# Patient Record
Sex: FEMALE | Race: OTHER | Employment: UNEMPLOYED | URBAN - METROPOLITAN AREA
[De-identification: names, ages, dates, MRNs, and addresses within clinical notes are randomized per-mention and may not be internally consistent; named-entity substitution may affect disease eponyms.]

---

## 2018-09-27 ENCOUNTER — OFFICE VISIT (OUTPATIENT)
Dept: FAMILY MEDICINE CLINIC | Age: 38
End: 2018-09-27

## 2018-09-27 VITALS
TEMPERATURE: 97.6 F | SYSTOLIC BLOOD PRESSURE: 118 MMHG | HEIGHT: 59 IN | BODY MASS INDEX: 32.34 KG/M2 | DIASTOLIC BLOOD PRESSURE: 84 MMHG | HEART RATE: 76 BPM | WEIGHT: 160.4 LBS

## 2018-09-27 DIAGNOSIS — N75.1 BARTHOLIN'S GLAND ABSCESS: Primary | ICD-10-CM

## 2018-09-27 RX ORDER — CEPHALEXIN 500 MG/1
500 CAPSULE ORAL 3 TIMES DAILY
Qty: 21 CAP | Refills: 0 | Status: SHIPPED | OUTPATIENT
Start: 2018-09-27 | End: 2018-10-04

## 2018-09-27 NOTE — PROGRESS NOTES
Downtime form used, connect care not available  Here for \"bleeding area on my labia\" for 1 month  Physical reveals ruptured abscessed bartholin cyst, rather soft now and only very minimal bloody fluid expressed  Sitz baths, keflex and f/up at gyn clinic for recheck and she is due pap now  She had \"some sort of sterilization surgery 16 years ago\" so pregnancy not a risk   She declines bimanual exam

## 2018-09-27 NOTE — PROGRESS NOTES
Patient seen for discharge with assistance from Margaret Ramirez, . The provider used downtime forms today due to connectivity at the clinic. She entered the patient's Keflex prescription from this computer. Per Good Rx website, no coupon needed for the prescription. It is on the $10 list. We also reviewed the provider's recommendation for using a sitz bath and showed the patient some pictures on Google images so that she would have a better idea of what she was looking for in the store. She also asked for something to help her  the prescription. I printed the prescription order for her to show the pharmacy and she understands that the paper is for her use and does not need to be turned in to the pharmacy. She will go now to registration to schedule a GYN appointment with Devota Clarity per the provider's instruction.  Cathie Johnson RN

## 2018-10-26 ENCOUNTER — OFFICE VISIT (OUTPATIENT)
Dept: OBGYN CLINIC | Age: 38
End: 2018-10-26

## 2018-10-26 VITALS
HEIGHT: 58 IN | BODY MASS INDEX: 34.43 KG/M2 | DIASTOLIC BLOOD PRESSURE: 75 MMHG | WEIGHT: 164 LBS | SYSTOLIC BLOOD PRESSURE: 121 MMHG

## 2018-10-26 DIAGNOSIS — L02.215 ABSCESS OF PERINEUM: Primary | ICD-10-CM

## 2018-10-26 RX ORDER — SULFAMETHOXAZOLE AND TRIMETHOPRIM 800; 160 MG/1; MG/1
1 TABLET ORAL 2 TIMES DAILY
Qty: 20 TAB | Refills: 0 | Status: SHIPPED | OUTPATIENT
Start: 2018-10-26 | End: 2018-11-05

## 2018-10-26 NOTE — PROGRESS NOTES
Chief Complaint Vulvar Abnormaility (Bump on perineum) CYNTHIA Guaman is a 43 y.o. female who presents for the evaluation of bump on perineum Patient's last menstrual period was 10/05/2018 (exact date). The patient complains of bump is painful, drains off/on yellow/blood,. It is located perineum. The symptoms are pain, drainage. They started 2 months ago. Since then they have become slightly more painful. Associated symptoms: nnone. Aggravating symptoms: none. Alleviating factors: draining. The patient denies fever. Was seen through [facility] last must.  Visit reviewed in [facility]. Dx'd with Bartholin's cyst.  Tx'd with Keflex 500mg TOD x7d. Completed abx. Did sitz bath once daily for a week. Not significantly improved. History reviewed. No pertinent past medical history. History reviewed. No pertinent surgical history. Social History Occupational History  Not on file Tobacco Use  Smoking status: Never Smoker  Smokeless tobacco: Never Used Substance and Sexual Activity  Alcohol use: No  
 Drug use: No  
 Sexual activity: Yes  
  Partners: Male Birth control/protection: Condom History reviewed. No pertinent family history. No Known Allergies Prior to Admission medications Not on File Review of Systems: History obtained from the patient Constitutional: negative for weight loss, fever, night sweats HEENT: negative for hearing loss, earache, congestion, snoring, sorethroat CV: negative for chest pain, palpitations, edema Resp: negative for cough, shortness of breath, wheezing Breast: negative for breast lumps, nipple discharge, galactorrhea GI: negative for change in bowel habits, abdominal pain, black or bloody stools : negative for frequency, dysuria, hematuria MSK: negative for back pain, joint pain, muscle pain Skin: negative for itching, rash, hives Neuro: negative for dizziness, headache, confusion, weakness Psych: negative for anxiety, depression, change in mood Heme/lymph: negative for bleeding, bruising, pallor Objective: 
Visit Vitals /75 Ht 4' 10\" (1.473 m) Wt 164 lb (74.4 kg) LMP 10/05/2018 (Exact Date) BMI 34.28 kg/m² Physical Exam: PHYSICAL EXAMINATION Constitutional 
· Appearance: well-nourished, well developed, alert, in no acute distress HENT 
· Head and Face: appears normal 
 
Gastrointestinal 
· Abdominal Examination: abdomen non-tender to palpation, no masses present · Liver and spleen: no hepatomegaly present, spleen not palpable · Hernias: no hernias identified Genitourinary · External Genitalia:  
 
 
 
 
· Vagina: normal vaginal vault without central or paravaginal defects, no discharge or odor present, no inflammatory lesions present, no masses present · Bladder: non-tender to palpation · Urethra: appears normal 
· Cervix: normal  
· Uterus: normal size, shape and consistency · Adnexa: no adnexal tenderness present, no adnexal masses present · Perineum: perineum within normal limits, no evidence of trauma, no rashes or skin lesions present · Anus: anus within normal limits, no hemorrhoids present · Inguinal Lymph Nodes: no lymphadenopathy present Skin · General Inspection: no rash, no lesions identified Neurologic/Psychiatric · Mental Status: · Orientation: grossly oriented to person, place and time · Mood and Affect: mood normal, affect appropriate Assessment:  
Perineal abscess, not Bartholin's 
 
Plan:  
Bactrim DS BID x10d Sitz bath 2-3x/d 
RTO prn

## 2018-11-13 ENCOUNTER — TELEPHONE (OUTPATIENT)
Dept: FAMILY MEDICINE CLINIC | Age: 38
End: 2018-11-13

## 2018-11-13 NOTE — LETTER
11/29/2018 10:21 AM 
 
Ms. Faby Juarez [place] [id number] Por favor llame a la oficina. El agustin es [phone]. Humberto. Sincerely, MARGARITO Yepez/ Agustín Florian RN

## 2018-11-13 NOTE — TELEPHONE ENCOUNTER
Maltese-speaking patient, Obed Sukhdeep, Int. [id number], said her bleeding is worse and wants to make an appointment for a PAP.       Melody Mathis April

## 2018-11-15 NOTE — TELEPHONE ENCOUNTER
Scheduled PAP at 1:10 on 2/4 and tried to call patient, Tamra Gordon, but msg said VM not set up.   I did schedule the PAP for 2/4 at 1:10 pm.      Mita Peterson

## 2018-11-20 NOTE — TELEPHONE ENCOUNTER
I tried to call pt and no answer and voicemail not set up. I tried 3 times. I tried pt's son on PHI and someone answered and then call disconnected.  Bee Perry, RN

## 2018-11-29 NOTE — TELEPHONE ENCOUNTER
Int # [id number]. Tc to the pt's listed home/mobile number. The VM has not been set up. The pt's son was called. No one answered. A VM was left for the pt to contact the [facility] office. A letter will be mailed. Closing encounter.   Brandi Wang RN

## 2019-02-04 ENCOUNTER — OFFICE VISIT (OUTPATIENT)
Dept: FAMILY MEDICINE CLINIC | Age: 39
End: 2019-02-04

## 2019-02-04 ENCOUNTER — HOSPITAL ENCOUNTER (OUTPATIENT)
Dept: LAB | Age: 39
Discharge: HOME | End: 2019-02-04

## 2019-02-04 VITALS
BODY MASS INDEX: 35.11 KG/M2 | DIASTOLIC BLOOD PRESSURE: 84 MMHG | HEART RATE: 93 BPM | SYSTOLIC BLOOD PRESSURE: 116 MMHG | WEIGHT: 168 LBS | TEMPERATURE: 98.7 F

## 2019-02-04 DIAGNOSIS — Z01.419 ENCOUNTER FOR WELL WOMAN EXAM: ICD-10-CM

## 2019-02-04 DIAGNOSIS — L02.215 ABSCESS OF PERINEUM: Primary | ICD-10-CM

## 2019-02-04 PROBLEM — E66.01 SEVERE OBESITY (HCC): Status: ACTIVE | Noted: 2019-02-04

## 2019-02-04 PROCEDURE — 88142 CYTOPATH C/V THIN LAYER: CPT

## 2019-02-04 RX ORDER — CEPHALEXIN 500 MG/1
500 CAPSULE ORAL 3 TIMES DAILY
Qty: 30 CAP | Refills: 0 | Status: SHIPPED | OUTPATIENT
Start: 2019-02-04 | End: 2019-02-14

## 2019-02-04 NOTE — PROGRESS NOTES
Assessment/Plan:    Diagnoses and all orders for this visit:    1. Abscess of perineum  -     cephALEXin (KEFLEX) 500 mg capsule; Take 1 Cap by mouth three (3) times daily for 10 days. Preston 1 pastilla 3 veces al caroline por 10 gonzalez    2. Encounter for well woman exam  -     PAP, LB, RFX HPV [id number]); Future    Refer to [facility] for mammogram in   Sitz baths, abx for small abscess perineum chronic in nature. Recheck in 1 month. Follow-up Disposition:  Return for for recheck of abscess . ALISA Gonsales expressed understanding of this plan. An AVS was printed and given to the patient.      ----------------------------------------------------------------------    Chief Complaint   Patient presents with    Well Woman     pap smear- last done 4 years ago, was normal       History of Present Illness:    , uses condoms for birth control  Pt complains of the same abscess continuing to bother her months after it first started. She was seen by GYN about a month after I saw her in  and given a second abx course- she states that she was doing the sitz baths as well but it never resolved. She doesn't remember how she was able to get the appt with gyn or where she went for the appt. Last pap was \"years ago\". Never has had a mammo  Periods are regular  No DV with current boyfriend  The abscess drains and sometimes is bloody    No past medical history on file. Current Outpatient Medications   Medication Sig Dispense Refill    cephALEXin (KEFLEX) 500 mg capsule Take 1 Cap by mouth three (3) times daily for 10 days. Preston 1 pastilla 3 veces al caroline por 10 gonzalez 30 Cap 0       No Known Allergies    Social History     Tobacco Use    Smoking status: Never Smoker    Smokeless tobacco: Never Used   Substance Use Topics    Alcohol use: No    Drug use: No       No family history on file.     Physical Exam:     Visit Vitals  /84 (BP 1 Location: Left arm, BP Patient Position: Sitting)   Pulse 93   Temp 98.7 °F (37.1 °C) (Oral)   Wt 168 lb (76.2 kg)   LMP 01/05/2019 (Approximate)   BMI 35.11 kg/m²       A&Ox3  WDWN NAD  Respirations normal and non labored  Pelvic exam- ext no discharge. She has a small  abscessed area mid perineum area with out any current pus but with a central open area.  The surrounding area is minimally indurated  Cervix and vagina are w/out lesion or discharge  Uterus and adnexal exam no mass or tenderness

## 2019-02-04 NOTE — PATIENT INSTRUCTIONS
Visita de control para personas de 18 a 50 años: Instrucciones de cuidado - [ Well Visit, Ages 25 to 48: Care Instructions ]  Instrucciones de cuidado    Los exámenes físicos pueden ayudarle a mantenerse saludable. Damon médico lester revisado damon estado general de oziel y podría haberle dado algunos consejos para cuidarse. También podría haberle recomendado otros exámenes. En damon hogar, usted puede ayudar a prevenir enfermedades si come de manera saludable, hace ejercicio con regularidad y sigue otras recomendaciones. La atención de seguimiento es elissa parte clave de damon tratamiento y seguridad. Asegúrese de hacer y acudir a todas las citas, y llame a damon médico si está teniendo problemas. También es elissa buena idea saber los resultados de leonor exámenes y mantener elissa lista de los medicamentos que dia. ¿Cómo puede cuidarse en el hogar? · Alcance un peso saludable y [place]. [place] disminuirá el riesgo de tener [employer], tales tomasa obesidad, diabetes, enfermedad cardíaca y presión arterial ashkan. · Edin actividad física por lo menos 30 minutos la mayoría de los días de la [place]. Caminar es elissa buena opción. Leanne Jeong desee hacer otras actividades, tomasa corretamia, davin, [employer], o jugar al tenis u otros deportes de equipo. Discuta con damon médico cualquier cambio que quiera introducir en damon programa de ejercicios. · No fume ni permita que otros fumen cerca de usted. Si necesita ayuda para dejar de fumar, hable con damon médico sobre programas y medicamentos para dejar de fumar. Pueden aumentar leonor probabilidades de dejar el hábito para siempre. · Consulte con damon médico si presenta factores de riesgo de infecciones de transmisión sexual (STI, por leonor siglas en inglés). Tener elissa sasha niko sexual (que no tenga STI y que no tenga relaciones sexuales con nadie más) es elissa buena manera de evitar estas infecciones. · Utilice métodos anticonceptivos si no desea tener hijos en winter momento.  Consulte con damon médico acerca de las opciones disponibles más adecuadas para usted. · Protéjase la piel del exceso de sol. [address] 10 a.m. y las 4 p.m., permanezca a la julia o Rody Mayra con prendas de vestir y un sombrero de ala ancha. Use gafas de sol que bloqueen los marita ultravioleta. Póngase un protector solar de amplio espectro (SPF 30 o superior) en la piel expuesta, incluso cuando esté nublado. · Acuda al dentista [facility] veces al año para hacerse chequeos y limpiezas dentales. · En el automóvil, use el cinturón de seguridad. · Kimberlyn alcohol en forma moderada, o no kimberlyn nada. [place] significa no más de 2 bebidas al día si es hombre, y no más de 1 al día si es [place]. Siga las recomendaciones de damon médico acerca de cuándo hacerse determinados exámenes. Estas pruebas pueden detectar problemas a tiempo. Para ambos sexos  · Colesterol. Hágase un análisis de la grasa (colesterol) en la sissy después de los 21 años de Fran. Damon médico le indicará con qué frecuencia debe [employer] análisis según damon edad, leonor antecedentes familiares u otros factores que pueden aumentar el riesgo de enfermedad cardíaca. · Presión arterial. Hágase armin la presión arterial randi elissa visita de rutina al médico. Damon médico le dirá con qué frecuencia debe revisarse la presión arterial según damon edad, leonor niveles de presión arterial y otros factores. · Visión. Hable con damon médico acerca de la frecuencia con que debe hacerse elissa prueba de glaucoma. · Diabetes. Pregúntele a damon médico si debería hacerse pruebas para la diabetes. · Cáncer de colon. Hágase elissa prueba para el cáncer de colon a los 48 años. Usted podría hacerse elissa de las [address]. Si tiene menos de [address], podría necesitar hacerse elissa prueba antes si tiene factores de [place]. Los factores de riesgo incluyen si ya le zee extraído un pólipo precanceroso del colon o si alguno de leonor padres, hermanos o hijos lester tenido cáncer de colon.   [address] mujeres  · El examen de senos y la [place]. Pregúntele a koehler médico cuándo debe hacerse un examen clínico de los senos (mamas) y [place]. Los expertos médicos no están de acuerdo en si elissa patrice de menos de 48 años de edad debe o no hacerse estos exámenes o con qué frecuencia. Koelher médico puede ayudarle a decidir qué es lo adecuado para usted. · La prueba de Papanicolaou y el examen Chris Mcgrath a hacerse pruebas de Papanicolaou a los 21 años. El Papanicolaou es la mejor manera de detectar el cáncer de tonya uterino. Esta prueba suele ser parte del examen pélvico. Pregunte con qué frecuencia debe hacerse esta prueba. · Infecciones de transmisión sexual (STI, por leonor siglas en inglés). Consulte si debe hacerse pruebas de detección de STI. Puede correr riesgo si tiene [employer] con más de [place] persona, especialmente si leonor parejas no utilizan condones. Para los hombres  · Infecciones de transmisión sexual (STI). Consulte si debe hacerse pruebas de detección de STI. Puede correr riesgo si tiene [employer] con más de [place] persona, especialmente si usted no utiliza condón. · Examen para el cáncer testicular. Pregúntele a koehler médico si debería hacerse un examen de testículos con regularidad. · Examen de próstata. Hable con koehler médico para saber si debe hacerse un análisis de sissy para el cáncer de próstata (prueba del PSA, por leonor siglas en inglés). Los expertos difieren en cuanto a si los hombres deben hacerse esta prueba y con qué frecuencia. Algunos especialistas lo recomiendan a las personas mayores de 39 años de origen afroamericano o que tienen un padre o un ernst que tuvo cáncer de próstata antes de los 65 Los tato. ¿Cuándo debe pedir ayuda? Preste especial atención a los cambios en koehler oziel y asegúrese de comunicarse con koehler médico si tiene problemas o síntomas que le preocupan. ¿Dónde puede encontrar más información en inglés?   Geovanny Sinks a http://danny-galo.info/. Escriba P072 en la búsqueda para aprender más acerca de \"Visita de control para personas de 18 a 50 años: Instrucciones de cuidado - [ Well Visit, Ages 25 to 48: Care Instructions ]. \"  Revisado: [address], 2018  Versión del contenido: 11.9  © 1604-0267 Healthwise, Incorporated. Las instrucciones de cuidado fueron adaptadas bajo licencia por [employer] (which disclaims liability or warranty for this information). Si usted tiene [place] afección médica o sobre estas instrucciones, siempre pregunte a damon profesional de oziel. Healthwise, Incorporated niega toda garantía o responsabilidad por damon uso de esta información. Absceso cutáneo: Instrucciones de cuidado - [ Skin Abscess: Care Instructions ]  Instrucciones de cuidado    Un absceso de la piel es elissa infección bacteriana que forma elissa bolsa de pus. Un forúnculo es elissa especie de absceso de la piel. Puede que el médico haya hecho elissa incisión en el absceso para que pueda drenar el pus. Vito vez tenga elissa gasa en el valentin para que el absceso se mantenga abierto y siga drenando. Sil Ortiz necesite antibióticos. Deberá hacer un seguimiento con damon médico para asegurarse de que la infección haya desaparecido. El médico lo lester examinado minuciosamente, jackie pueden desarrollarse problemas más tarde. Si nota algún problema o nuevos síntomas, busque tratamiento médico de inmediato. La atención de seguimiento es elissa parte clave de damon tratamiento y seguridad. Asegúrese de hacer y acudir a todas las citas, y llame a damon médico si está teniendo problemas. También es elissa buena idea saber los resultados de leonor exámenes y mantener elissa lista de los medicamentos que dia. ¿Cómo puede cuidarse en el hogar? · Aplíquese compresas calientes y secas, elissa almohadilla térmica ajustada a baja temperatura o elissa bolsa de [place] 3 o 4 veces al día para el dolor.  Mantenga un paño entre la carin de calor y la piel.  · Si damon médico le recetó antibióticos, tómelos según las indicaciones. No deje de tomarlos solo porque se sienta mejor. Debe armin todos los antibióticos hasta terminarlos. · [employer] analgésicos (medicamentos para el dolor) exactamente según las indicaciones. ? Si el médico le recetó analgésicos, tómelos según las indicaciones. ? Si no está tomando un analgésico recetado, pregúntele a damon médico si puede armin un medicamento de [employer]. · Mantenga la venda limpia y seca. Cambie la venda cuando se moje o se ensucie, o por lo menos elissa vez al día. · Si se taponó el absceso con gasa:  ? Yasir Cueto a las citas de seguimiento para que le quiten o le cambien la gasa. Si el médico le indicó que se quitara usted la gasa, siga las instrucciones que le dieron acerca de cómo [place]. ? Después de quitar la gasa, empape la kacie con agua tibia de 15 a 20 minutos 2 veces al día, hasta que la herida se cierre. ¿Cuándo debe pedir ayuda? Llame a damon médico ahora mismo o busque atención médica inmediata si:    · Tiene señales de elissa infección que está empeorando, tales tomasa:  ? Aumento del dolor, la hinchazón, la temperatura o el enrojecimiento. ? Vetas rojizas que emanan de la piel infectada. ? Pus que supura de la herida. ? Romayne Junk de cerca los cambios en damon oziel y asegúrese de comunicarse con damon médico si:    · No mejora tomasa se esperaba. ¿Dónde puede encontrar más información en inglés? Kaushik Smallwood a http://danny-galo.info/. Elfego Snow G323 en la búsqueda para aprender más acerca de \"Absceso cutáneo: Instrucciones de cuidado - [ Skin Abscess: Care Instructions ]. \"  Revisado: 17 kaykay, 2018  Versión del contenido: 11.9  © 0276-0639 [employer]. Las instrucciones de cuidado fueron adaptadas bajo licencia por Good Help Connections (which disclaims liability or warranty for this information).  Si usted tiene [place] afección médica o sobre estas instrucciones, siempre pregunte a damon profesional de oziel. [facility] niega toda garantía o responsabilidad por damon uso de esta información.

## 2019-02-04 NOTE — PROGRESS NOTES
Int # [id number]. Provided pt with information and phone # for [facility]. Explained that they will do a financial screening before scheduling appt. Printed AVS, provided to pt and reviewed. Pt indicated understanding and had no questions. Told pt that rx's have been sent to pharmacy and they should be ready for  in approximately 2 hrs. Reviewed medication ordered today with the pt. Pt instructed to continue sitz bath's. Pt to return next available appt in May for abscess re-check.  Llana Cockayne, RN

## 2019-02-08 ENCOUNTER — TELEPHONE (OUTPATIENT)
Dept: FAMILY MEDICINE CLINIC | Age: 39
End: 2019-02-08

## 2019-02-08 ENCOUNTER — DOCUMENTATION ONLY (OUTPATIENT)
Dept: FAMILY MEDICINE CLINIC | Age: 39
End: 2019-02-08

## 2019-02-08 DIAGNOSIS — R87.611 PAP SMEAR OF CERVIX WITH ASCUS, CANNOT EXCLUDE HGSIL: Primary | ICD-10-CM

## 2019-02-08 NOTE — PROGRESS NOTES
Telephone call to the pt: re to discuss the results of her testing and to indicate that I am referring her to the specialist. I have left a message on her voice mail. I am putting in the referral as I told her on the phone message and I have asked her to answer the phone when you call.  Thank you

## 2019-02-08 NOTE — PROGRESS NOTES
Pap shows ASCUS cannot exclude High grade lesion. I have spoken to the nurse at [facility] and they can take her for this problem.  Please direct the pt to the [facility] program when you call her, or in case she calls

## 2019-02-08 NOTE — TELEPHONE ENCOUNTER
Tc to the pt. Using int # [id number]. Tc to the pt. She was given [facility] phone number and the program was explained to her. The pt was told they would do a financial screening over the phone and then she would be scheduled an appt. The pt was told to let [facility] know she was a pt of the [facility] and she had been referred to [facility] for an abnormal PAP. The pt verbalized understanding.  Jeremy Dumont RN

## 2019-03-19 ENCOUNTER — TELEPHONE (OUTPATIENT)
Dept: FAMILY MEDICINE CLINIC | Age: 39
End: 2019-03-19

## 2019-05-06 ENCOUNTER — OFFICE VISIT (OUTPATIENT)
Dept: FAMILY MEDICINE CLINIC | Age: 39
End: 2019-05-06

## 2019-05-06 DIAGNOSIS — Z00.00 ENCOUNTER FOR WELL WOMAN EXAM WITHOUT GYNECOLOGICAL EXAM: Primary | ICD-10-CM

## 2019-05-06 NOTE — PROGRESS NOTES
Provided pt with information and phone # for [facility]. Explained that they will do a financial screening before scheduling appt. Figueroa Leon was . Pt is on her period today and is now eligible for [facility].  Steven Velasco RN

## 2019-05-06 NOTE — PROGRESS NOTES
Pt presents for pap clinic. Started period yesterday and having heavy bleeding. She is 39 now (as of 2 days ago) so will refer to [facility]. She is due for first mammogram too.

## 2019-05-16 ENCOUNTER — OFFICE VISIT (OUTPATIENT)
Dept: FAMILY PLANNING/WOMEN'S HEALTH CLINIC | Age: 39
End: 2019-05-16

## 2019-05-16 ENCOUNTER — HOSPITAL ENCOUNTER (OUTPATIENT)
Dept: MAMMOGRAPHY | Age: 39
Discharge: HOME | End: 2019-05-16
Attending: NURSE PRACTITIONER

## 2019-05-16 VITALS — SYSTOLIC BLOOD PRESSURE: 117 MMHG | DIASTOLIC BLOOD PRESSURE: 82 MMHG

## 2019-05-16 DIAGNOSIS — Z12.31 VISIT FOR SCREENING MAMMOGRAM: ICD-10-CM

## 2019-05-16 DIAGNOSIS — Z12.31 SCREENING MAMMOGRAM, ENCOUNTER FOR: Primary | ICD-10-CM

## 2019-05-16 PROCEDURE — 77067 SCR MAMMO BI INCL CAD: CPT

## 2019-05-16 NOTE — PROGRESS NOTES
HISTORY OF PRESENT ILLNESS  Karis Sharpe is a 35 y.o. female here for [facility] clinic. HPI Ms. Zeenat Sarkar denies abnormal SBE's performs every now and then. Today is her first mammogram. LMP 05/6/2019 with normal monthly cycles. Denies hormone and BC use. H/O ASCUS on a recent Pap ([facility]) and will be having a colpo done. Non-smoker. Review of Systems   Constitutional: Negative for chills, fever, malaise/fatigue and weight loss. Physical Exam   Constitutional: She is oriented to person, place, and time. She appears well-developed and well-nourished. Pulmonary/Chest: Right breast exhibits no inverted nipple, no mass, no nipple discharge, no skin change and no tenderness. Left breast exhibits no inverted nipple, no mass, no nipple discharge, no skin change and no tenderness. Breasts are symmetrical.   Lymphadenopathy:     She has no cervical adenopathy. She has no axillary adenopathy. Right: No supraclavicular adenopathy present. Left: No supraclavicular adenopathy present. Neurological: She is alert and oriented to person, place, and time. Skin: Skin is warm and dry. Psychiatric: She has a normal mood and affect. Her behavior is normal. Thought content normal.   Nursing note and vitals reviewed. ASSESSMENT and PLAN  1. Komen  2. CBE, benign  3.  Screening mammogram today       -baseline

## 2019-05-16 NOTE — PROGRESS NOTES
EVERY WOMANS LIFE HISTORY QUESTIONNAIRE       No Yes Comments   Has a doctor ever seen or felt anything wrong with your breast? [x]                                  []                                     Have you ever had a breast biopsy? [x]                                  []                                          When and where was last mammogram performed? This is first one    Have you ever been told that there was a problem on your mammogram?   No Yes Comments   []                                  []                                  n/a     Do you have breast implants? No Yes Comments   [x]                                  []                                       When was your last Pap test performed? 2/4/2019 at the [facility]. ASCUS-H    Have you ever had an abnormal Pap test?   No Yes Comments   []                                  [x]                                  ASCUS-H 2/2019. Colpo scheduled for 5/23/19 with [facility]     Have you had a hysterectomy? No Yes Comments (why)   [x]                                  []                                       Have you been through menopause? No Yes Date of LMP   [x]                                  []                                  5/6/19     Did your mother take YOSHI? No Yes Unknown   [x]                                  []                                       Do you have a history of HIV exposure? No Yes    [x]                                  []                                       Have you ever been diagnosed with any type of Cancer   No Yes Comments (type,when,where,type of treatment   [x]                                  []                                          Has a family member been diagnosed with breast or ovarian cancer?    No Yes Comments (which family members, and type   [x]                                  []                                       Are you taking hormone replacement therapy (HRT)     No Yes Comments   [x] []                                       How many times have you been pregnant? 3        Number of live births ? 3    Are you experiencing any of the following? No Yes Comments   Nipple Discharge [x]                                  []                                     Breast Lump/Masses [x]                                  []                                     Breast Skin Changes [x]                                  []                                          No Yes Comments   Vaginal Discharge [x]                                  []                                     Abnormal/unusual vaginal bleeding [x]                                  []                                         Are you experiencing any other health problems? None        Age at first period? 14  Age at first birth?  11

## 2019-05-23 ENCOUNTER — HOSPITAL ENCOUNTER (OUTPATIENT)
Dept: LAB | Age: 39
Discharge: HOME | End: 2019-05-23

## 2019-05-23 ENCOUNTER — OFFICE VISIT (OUTPATIENT)
Dept: OBGYN CLINIC | Age: 39
End: 2019-05-23

## 2019-05-23 VITALS
HEIGHT: 58 IN | HEART RATE: 82 BPM | RESPIRATION RATE: 18 BRPM | DIASTOLIC BLOOD PRESSURE: 78 MMHG | BODY MASS INDEX: 35.89 KG/M2 | WEIGHT: 171 LBS | TEMPERATURE: 98.3 F | OXYGEN SATURATION: 99 % | SYSTOLIC BLOOD PRESSURE: 108 MMHG

## 2019-05-23 DIAGNOSIS — Z13.9 SCREENING PROCEDURE: ICD-10-CM

## 2019-05-23 DIAGNOSIS — R87.611 PAP SMEAR OF CERVIX WITH ASCUS, CANNOT EXCLUDE HGSIL: Primary | ICD-10-CM

## 2019-05-23 LAB
HCG URINE, QL. (POC): NEGATIVE
VALID INTERNAL CONTROL?: YES

## 2019-05-23 NOTE — PROCEDURES
Written and verbal consent obtained. Speculum was inserted and cervix was visualized. Cervix was then coated with acetic acid. Colposcopy was performed with clear and green lenses with the following findings:    Squamocolumnar junction was visualized in its entirety. Area of acetowhite and mosaicism along anterior lip of cervix from 10-1 o'clock  Area of acetowhite from 7 to 9 o'clock    ECC was then performed. Ectocervical biopsies were taken at 12 and 7 o'clock. Hemostasis was achieved using Monsels. Hemostasis was noted to be excellent. Speculum was removed. Patient tolerated the procedure well and there were no complications.

## 2019-05-23 NOTE — PROGRESS NOTES
35 y.o. who presents for follow up of abnormal pap smear. Patient with ASCUS-H pap 2/2019. Has not' hx of abnormal paps. Patient does not smoke. Review of symptoms:  Constitutional: negative  Urinary: negative  CV: negative    Neuro: negative  Resp: negative   Psych: negative  GI: negative    Musculoskeletal: negative  GYN: per HPI    Integumentary: negative    Physical exam:    Gen: AOx3, NAD  Resp: No respiratory distress  GYN: normal external genitalia. Normal vagina. Grossly normal cervix.     A/P  35 y.o. with abnormal pap smear.  - colposcopy  - will call patient with results and plan    Return to clinic PRN or for annual exam     #: [id number]

## 2019-05-23 NOTE — PROGRESS NOTES
Chief Complaint   Patient presents with    Colposcopy     Pt presents in office for colpo. 3 most recent PHQ Screens 5/23/2019   Little interest or pleasure in doing things Not at all   Feeling down, depressed, irritable, or hopeless Not at all   Total Score PHQ 2 0     1. Have you been to the ER, urgent care clinic since your last visit? Hospitalized since your last visit? No    2. Have you seen or consulted any other health care providers outside of the [address] [place] [address] since your last visit? Include any pap smears or colon screening.  No    JORGE CABALLERO AT [facility]  OFFICE PROCEDURE PROGRESS NOTE        Chart reviewed for the following:   Alfredo WHELAN LPN, have reviewed the History, Physical and updated the Allergic reactions for Javon 71 performed immediately prior to start of procedure:   Alfredo WHELAN LPN, have performed the following reviews on Syed Horn prior to the start of the procedure:            * Patient was identified by name and date of birth   * Agreement on procedure being performed was verified  * Risks and Benefits explained to the patient  * Procedure site verified and marked as necessary  * Patient was positioned for comfort  * Consent was signed and verified     Time: 1:26 pm      Date of procedure: 5/23/2019    Procedure performed by:  Eliana Boucher MD    Provider assisted by:  LPN    Patient assisted by: self    How tolerated by patient: tolerated the procedure well with no complications    Post Procedural Pain Scale: 0 - No Hurt    Comments: none        Results for orders placed or performed in visit on 05/23/19   AMB POC URINE PREGNANCY TEST, VISUAL COLOR COMPARISON   Result Value Ref Range    VALID INTERNAL CONTROL POC Yes     HCG urine, Ql. (POC) Negative Negative

## 2019-05-23 NOTE — PATIENT INSTRUCTIONS
Prueba de Papanicolaou anormal: Instrucciones de cuidado - [ Abnormal Pap Test: Care Instructions ] Instrucciones de cuidado La prueba de Papanicolaou (también llamada citología vaginal) se hace para detectar cambios tempranos que podrían convertirse en cáncer de tonya uterino. Damon prueba de Papanicolaou resultó anormal. Eso podría significar que algunas células del tonya uterino [place]. Los cambios celulares en damon mayoría son causados por infección del virus del papiloma humano (VPH). Tener un resultado anormal de la prueba de Papanicolaou no significa que las células anómalas le causarán cáncer. Los [employer] células del tonya uterino podrían desaparecer por sí solos o progresar lentamente. Es posible que damon médico [place] repetir la prueba de Papanicolaou o que usted se armando otras pruebas para luis manuel si hay cambios celulares. Es muy importante que se armando pruebas de Papanicolaou con regularidad después de annalee tenido elissa prueba de Papanicolaou anormal. 
La atención de seguimiento es elissa parte clave de damon tratamiento y seguridad. Asegúrese de hacer y acudir a todas las citas, y llame a damon médico si está teniendo problemas. También es elissa buena idea saber los resultados de leonor exámenes y mantener elissa lista de los medicamentos que dia. Cómo puede cuidarse en el hogar? · No fume. Fumar podría aumentar el riesgo de cambios en las células del tonya uterino. Si necesita ayuda para dejar de fumar, hable con damon médico sobre programas y medicamentos para dejar de fumar. Estos pueden aumentar leonor probabilidades de dejar el hábito para siempre. · Asegúrese de Mitali Oh pruebas de Papanicolaou u otras pruebas de seguimiento que damon médico le haya indicado. Cuándo debe pedir ayuda? Vigile muy de cerca los cambios en damon oziel, y asegúrese de comunicarse con damon médico si tiene algún problema. Dónde puede encontrar más información en inglés? Khai Thakkar a http://danny-galo.info/. Escriba P925 en la búsqueda para aprender más acerca de \"Prueba de Papanicolaou anormal: Instrucciones de cuidado - [ Abnormal Pap Test: Care Instructions ]. \" 
Revisado: 27 marzo, 2018 Versión del contenido: 11.9 © 1288-1473 Healthwise, Incorporated. Las instrucciones de cuidado fueron adaptadas bajo licencia por Good Help Connections (which disclaims liability or warranty for this information). Si usted tiene [place] afección médica o sobre estas instrucciones, siempre pregunte a damon profesional de oziel. Healthwise, Incorporated niega toda garantía o responsabilidad por damon uso de esta información. Colposcopia: Antes del procedimiento - [ Colposcopy: Before Your Procedure ] Radha Boris es elissa colposcopia? La colposcopia le permite al médico examinarle la vulva, la vagina y el tonya uterino. Si el médico detecta un posible problema, puede armin elissa pequeña muestra de tejido. Luego, otro médico observa el tejido bajo un microscopio. [place] se llama biopsia. A la mayoría de las mujeres se les hace winter procedimiento cuando obtienen resultados anormales de elissa prueba de Papanicolaou. Yadira la prueba, el médico le insertará un instrumento lubricado en la vagina. Se llama espéculo y separa suavemente los costados de la vagina. [place] le permite al [employer] luis manuel dentro de damon vagina y el tonya uterino. El médico además utilizará un dispositivo de aumento para poder luis manuel mejor. Winter dispositivo no se introduce en la vagina. Es posible que el médico le coloque vinagre o yodo en el tonya uterino. [place] puede ayudarle al médico a observar cualquier kacie que no sea normal. A veces, el médico también dia fotos o video. El espéculo puede ser algo molesto cuando se introduce. Si el médico hace elissa biopsia, es posible que usted sienta un pellizco y tenga algunos cólicos. La atención de seguimiento es elissa parte clave de damon tratamiento y seguridad.  Asegúrese de hacer y acudir a todas las citas, y llame a damon médico si está teniendo problemas. También es elissa buena idea saber los resultados de los exámenes y mantener elissa lista de los medicamentos que dia. Zachary Copas antes del procedimiento? 
 Cómo prepararse para el procedimiento 
  · Dígale a damon médico si: 
? Está teniendo damon período menstrual. Esta prueba no suele hacerse randi el período. [place] se debe a que la sissy hace que sea más difícil luis manuel el tonya uterino. ? Está o pudiera estar embarazada. Se puede hacer un análisis de [place] o de [place] para luis manuel si está usted [place]. La colposcopia es garcia randi el [facility]. La probabilidad de aborto espontáneo es muy pequeña. [place] podría tener algo de sangrado si le hacen elissa biopsia. ? Dia anticoagulantes, tomasa warfarina (Coumadin), clopidogrel (Plavix) o aspirina.  
  · No se armando lavados vaginales, no use tampones, no tenga relaciones sexuales ni use medicamentos vaginales randi 24 horas antes de la prueba.  
  · Entienda exactamente qué procedimiento está planificado, junto con los [place], los beneficios y las otras alternativas. · Infórmeles a leonor médicos sobre [employer], las vitaminas, los suplementos y los raegan herbarios que dia. Algunos de [employer] aumentar el riesgo de sangrado.  
  · Damon médico le dirá qué medicamentos armin o dejar de armin antes del procedimiento. Es posible que deba dejar de armin ciertos medicamentos elissa semana o más antes del procedimiento, así que hable con damon médico tan pronto tomasa pueda.  
  · Informe a damon médico si tiene instrucciones médicas por anticipado. Estas pueden incluir un testamento vital y un poder legal permanente para la atención médica. Lleve consigo elissa copia cuando vaya al hospital. Si no las tiene, sería conveniente prepararlas. [employer] [facility] [place] médico y seres queridos sabrán leonor deseos sobre la [address].  Los médicos recomiendan que todas las personas preparen estos documentos antes de cualquier tipo de [place] o procedimiento. Los procedimientos pueden ser estresantes. Esta información le ayudará a entender qué puede esperar. Y le ayudará a prepararse de manera garcia para el procedimiento. [place] el día del procedimiento? 
  · Vito vez desee armin un analgésico (medicamento para el dolor) entre 30 y 60 minutos antes de la prueba. [place] puede ayudar a reducir los cólicos debidos a la biopsia. El ibuprofeno (Advil o Motrin) es elissa buena opción.  
  · Báñese o dúchese antes de acudir al consultorio médico para el procedimiento. No use lociones, perfumes, desodorantes ni esmalte de uñas.  
 En el consultorio médico 
 · Lleve un documento de identidad con foto.  
  · El procedimiento durará entre 15 y 30 minutos. El regreso a damon hogar · Recibirá instrucciones más específicas acerca de la recuperación del procedimiento. Cuándo debe llamar a damon médico? 
 · Tiene preguntas o inquietudes.  
  · No entiende cómo debe prepararse para el procedimiento.  
  · Se enferma antes del procedimiento (por ejemplo, tiene fiebre, un resfriado o gripe).  
  · Necesita reprogramar el procedimiento o cambió de opinión acerca de hacerse el procedimiento. Dónde puede encontrar más información en inglés? Vicente Sandeep a http://danny-galo.info/. Nicole HOFF en la búsqueda para aprender más acerca de \"Colposcopia: Antes del procedimiento - [ Colposcopy: Before Your Procedure ]. \" 
Revisado: 27 marzo, 2018 Versión del contenido: 11.9 © 2493-7499 Shopo, Incorporated. Las instrucciones de cuidado fueron adaptadas bajo licencia por Good Help Connections (which disclaims liability or warranty for this information). Si usted tiene [place] afección médica o sobre estas instrucciones, siempre pregunte a damon profesional de oziel. [facility], Incorporated niega toda garantía o responsabilidad por damon uso de esta información. Colposcopia: [place] en el [facility] - [ Colposcopy: What to Expect at [facility] ] Damon recuperación Si le hicieron elissa biopsia, es posible que sienta algo de dolor en la vagina randi un [address] Algo de sangrado o flujo vaginal es normal hasta elissa semana después de elissa biopsia. El flujo puede ser de color oscuro si le pusieron elissa solución en el tonya uterino. Puede usar elissa toalla sanitaria para el sangrado. Podría tardar [facility] a [place] para que tenga los [place] de la prueba. Esta hoja de [employer] idea general del tiempo que le llevará recuperarse. Sin embargo, cada persona se recupera a un ritmo diferente. Siga los pasos a continuación para mejorar con la mayor rapidez posible. Cómo puede cuidarse en el [facility]? Actividad 
  · Puede volver al [place] y a la mayoría de leonor actividades diarias inmediatamente después de la prueba. Ejercicio 
  · No armando ejercicio randi 1 día después de la prueba. Medicamentos 
  · Damon médico le dirá si puede volver a armin leonor medicamentos y cuándo puede volver a hacerlo. También le dará indicaciones sobre cualquier medicamento nuevo que deba armin usted.  
  · Si dia medicamentos que previenen la formación de coágulos de [place], tomasa warfarina (Coumadin), clopidogrel (Plavix) o aspirina, asegúrese de hablar con damon médico. Él o ladarius le dirá si debe volver a armin estos medicamentos y en qué momento. Asegúrese de que entiende exactamente lo que el médico quiere que armando.  
  · Superior un analgésico (medicamento para el dolor) de venta luz maria, tomasa acetaminofén (Tylenol), ibuprofeno (Advil, Motrin) o naproxeno (Aleve). Sea kristin con los medicamentos. [place] y siga todas las instrucciones de la [place]. No tome dos o más analgésicos al mismo tiempo a menos que el médico se lo haya indicado. Muchos analgésicos contienen acetaminofén, es decir, Tylenol. El exceso de acetaminofén (Tylenol) puede ser dañino. Otras instrucciones   · Use elissa toalla sanitaria si tiene algo de sangrado.  
  · No se armando lavados vaginales, no tenga relaciones sexuales ni use tampones randi 1 semana si le hicieron elissa biopsia. [place] dará tiempo para que sane el tonya uterino.  
  · Puede bañarse o ducharse en cualquier momento después de la prueba. Esta hoja de [employer] idea general de cuánto tiempo tardará en recuperarse. Sin embargo, cada persona se recupera a un ritmo diferente. Siga los pasos siguientes para sentirse mejor iglesias pronto tomasa sea posible. Cuándo debe pedir ayuda? Llame a damon médico ahora mismo o busque atención médica inmediata si: 
  · Tiene sangrado vaginal intenso. [place] significa que empapa las toallas sanitarias o los tampones que suele usar cada hora, randi 2 o más horas.  
  · El dolor no mejora después de armin analgésicos.  
  · Tiene señales de infección, tales tomasa: ? [place] Mitali Oh. ? Flujo vaginal con un olor desagradable. ? [place] especial atención a cualquier cambio en damon oziel y asegúrese de ponerse en contacto con damon médico si: 
  · Tiene preguntas o inquietudes. Dónde puede encontrar más información en inglés? [place] a http://danny-galo.info/. Glorya Sacks M523 en la búsqueda para aprender más acerca de \"Colposcopia: Say [place] en el [facility] - [ Colposcopy: What to Expect at [facility] ]. \" 
Revisado: 27 marzo, 2018 Versión del contenido: 11.9 © 0589-4368 Revaluate, Incorporated. Las instrucciones de cuidado fueron adaptadas bajo licencia por Good Help Connections (which disclaims liability or warranty for this information). Si usted tiene [place] afección médica o sobre estas instrucciones, siempre pregunte a damon profesional de oziel. [facility], Incorporated niega toda garantía o responsabilidad por damon uso de esta información.

## 2019-05-29 NOTE — PROGRESS NOTES
Called pt with  employee number [id number] pt adv of all results and pt was scheduled for colpo at 3:00 PM ON 6-12-19 at [facility] pt verbalized understanding to all results and location time and date of procedure.

## 2019-06-03 ENCOUNTER — TELEPHONE (OUTPATIENT)
Dept: FAMILY MEDICINE CLINIC | Age: 39
End: 2019-06-03

## 2019-06-03 NOTE — TELEPHONE ENCOUNTER
Patient called main office wanted to talk to a nurse about some paper that they need to be sent to the specialist and a appointment that she don't know when exactly is it. Patient sound very confused and would like someone to call her back in Slovenian please .     Thank you

## 2019-06-05 ENCOUNTER — TELEPHONE (OUTPATIENT)
Dept: FAMILY PLANNING/WOMEN'S HEALTH CLINIC | Age: 39
End: 2019-06-05

## 2019-06-05 NOTE — TELEPHONE ENCOUNTER
Talked to patient about what  a LEEP. Asked patient if she had any questions but she said not that she understood. Also sent her a financial aid application and explained the process within [facility] [employer]. Told her that [facility] is working to decide if [facility] is better or BS financial aid so I told her someone would be in touch from [facility] as well.

## 2019-06-06 ENCOUNTER — DOCUMENTATION ONLY (OUTPATIENT)
Dept: FAMILY MEDICINE CLINIC | Age: 39
End: 2019-06-06

## 2019-06-06 NOTE — TELEPHONE ENCOUNTER
I have spoken with the patient. Please seen documentation only note. Patient has applied for the Care Card and has established care with [address] [place] [address] OB/GYN so she does not need a referral through [facility] for GYN at this time.  Modesta Holliday RN

## 2019-06-06 NOTE — PROGRESS NOTES
From: MARGARITO Benoit   Sent: 6/4/2019  10:43 AM   To: Alveta Blizzard, GERMAIN, Shruthi Amos, GERMAIN, *   Subject: FW: LEEP                                         Please help me with this pt. She had abnormal pap and was [facility]. [facility] paid for colpo but this too was abnormal and she needs to have a LEEP, which [facility] does not pay for. She has an appt scheduled for 6/12/19. Can someone call her and explain to her about the care card application? That would obviously be the easiest solution but if Mari and Fox Sommers think that this should be changed to access now then I will be happy to start the process. Per chart review, the patient has recently seen Dr. Jena Sue and Dr. Samira Del Real, [facility]. Telephone call made to the patient with assistance from [employer]  #[id number]. The patient stated that she has not received any bills for her recent OG/GYN visits and has applied for the Care Card. She also stated that she spoke with the Care Card people yesterday and was told that she should be receiving her card soon.      Since the patient has established care with [facility] and has applied for the Care Card, she does not need a referral for GYN to [facility] at this time.     -Mari

## 2019-06-11 ENCOUNTER — DOCUMENTATION ONLY (OUTPATIENT)
Dept: FAMILY PLANNING/WOMEN'S HEALTH CLINIC | Age: 39
End: 2019-06-11

## 2019-06-12 ENCOUNTER — HOSPITAL ENCOUNTER (OUTPATIENT)
Dept: LAB | Age: 39
Discharge: HOME | End: 2019-06-12

## 2019-06-12 ENCOUNTER — OFFICE VISIT (OUTPATIENT)
Dept: OBGYN CLINIC | Age: 39
End: 2019-06-12

## 2019-06-12 DIAGNOSIS — D06.9 CIN III (CERVICAL INTRAEPITHELIAL NEOPLASIA GRADE III) WITH SEVERE DYSPLASIA: Primary | ICD-10-CM

## 2019-06-12 LAB
HCG URINE, QL. (POC): NEGATIVE
VALID INTERNAL CONTROL?: YES

## 2019-06-12 RX ORDER — HYDROCORTISONE 25 MG/G
CREAM TOPICAL 3 TIMES DAILY
Qty: 30 G | Refills: 2 | Status: SHIPPED | OUTPATIENT
Start: 2019-06-12 | End: 2020-06-11

## 2019-06-12 NOTE — PROGRESS NOTES
Chief Complaint   Patient presents with   Laci Morales presents in office for LEEP procedure. 10 ml of Lidocaine was used Lot # [id number] Exp 12/2019 AMF:[id number]    3 most recent PHQ Screens 6/12/2019   Little interest or pleasure in doing things Not at all   Feeling down, depressed, irritable, or hopeless Not at all   Total Score PHQ 2 0     1. Have you been to the ER, urgent care clinic since your last visit? Hospitalized since your last visit? No    2. Have you seen or consulted any other health care providers outside of the [address] [place] [address] since your last visit? Include any pap smears or colon screening.  No        JORGE CABALLERO AT [facility]  OFFICE PROCEDURE PROGRESS NOTE        Chart reviewed for the following:   Enrique WHELAN LPN, have reviewed the History, Physical and updated the Allergic reactions for Javon 71 performed immediately prior to start of procedure:   Enrique WHELAN LPN, have performed the following reviews on Ramona Certain prior to the start of the procedure:            * Patient was identified by name and date of birth   * Agreement on procedure being performed was verified  * Risks and Benefits explained to the patient  * Procedure site verified and marked as necessary  * Patient was positioned for comfort  * Consent was signed and verified     Time: 2:45 pm      Date of procedure: 6/12/2019    Procedure performed by:  Lane Adam MD    Provider assisted by:  LPN    Patient assisted by: self    How tolerated by patient: tolerated the procedure well with no complications    Post Procedural Pain Scale: 0 - No Hurt    Comments: none    ID [id number]

## 2019-06-12 NOTE — PROGRESS NOTES
35 y.o. who presents for LEEP    Patient with colposcopy for ASCUS -H pap    Pathology showed:     1. Cervix, endocervical curettage:   Free-floating fragments of squamous epithelium demonstrating high-grade squamous intraepithelial lesion (LUIS 2-3). Fragments of benign endocervical epithelium. 2. Cervix, 7:00, biopsy:   Scant fragments of squamous epithelium demonstrating high-grade squamous intraepithelial lesion (LUIS 2-3). 3. Cervix, 12:00, biopsy:   Predominantly benign squamous epithelium with few clusters of atypical cells suspicious for high-grade squamous intraepithelial lesion.      Has no hx of abnormal paps.     Patient does not smoke.     Review of symptoms:  Constitutional: negative                      Urinary: negative  CV: negative                                       Neuro: negative  Resp: negative                                    Psych: negative  GI: negative                                         Musculoskeletal: negative  GYN: per HPI                                      Integumentary: negative     Physical exam:     Gen: AOx3, NAD  Resp: No respiratory distress  GYN: normal external genitalia. Normal vagina. Grossly normal cervix.   Anal:  Posterior hemorrhoid present     A/P  35 y.o. with LUIS III  - LEEP  - anusol for hemorrhoids (TID for 1 week then PRN)    Return to clinic in 2 weeks     #: [id number]

## 2019-06-12 NOTE — PATIENT INSTRUCTIONS
Procedimiento de escisión electroquirúrgica con asa: Antes del procedimiento - [ Loop Electrosurgical Excision Procedure (LEEP): Before Your Procedure ]  ¿Qué es el procedimiento de escisión electroquirúrgica con asa? El procedimiento de escisión electroquirúrgica con asa (LEEP, por leonor siglas en inglés) extirpa tejido del tonya uterino. Es posible que le ismael winter procedimiento si le hicieron elissa prueba de Papanicolaou y [place] tejido que no es normal.  Randi el LEEP, damon médico le insertará en la vagina un instrumento llamado espéculo. Winter separa suavemente los lados de la vagina. [employer] le permite al médico luis manuel el tonya uterino y el interior de la vagina. A veces se pone un líquido especial en el tonya uterino para que ciertas zonas hiren más fáciles de luis manuel. Es posible que le den elissa inyección de medicamento para adormecer el tonya uterino. Vito vez sienta un cólico cuando le den la inyección. También es posible que le administren analgésicos (medicamentos para el dolor). Damon médico le insertará un dispositivo con un asa de alambre tomas en la vagina. El médico utilizará el alambre caliente para extirpar el tejido. Podría tener cólicos leves randi varias horas después del LEEP. Es normal tener un flujo de color amarronado oscuro randi la primera semana. Es posible que tenga un poco de manchado randi aproximadamente 3 semanas. Usted debería poder retomar damon rutina habitual entre 1 y 3 camilla [facility]. El tiempo que le lleve recuperarse dependerá de lo que le hicieron randi el procedimiento. La atención de seguimiento es elissa parte clave de damon tratamiento y seguridad. Asegúrese de hacer y acudir a todas las citas, y llame a damon médico si está teniendo problemas. También es elissa buena idea saber los resultados de los exámenes y mantener elissa lista de los medicamentos que dia.   ¿Qué ocurre antes del procedimiento?   Cómo prepararse para el procedimiento    · Dígale a damon médico si:  ? Está teniendo damon período menstrual.  ? Está o pudiera estar embarazada. Podrían hacerle un análisis de [place] o de [facility] para luis manuel si está usted embarazada.     · No se armando lavados vaginales ni utilice tampones y no tenga relaciones sexuales ni use medicamentos vaginales randi 24 horas antes de la prueba.     · Entienda exactamente qué procedimiento está planificado, junto con los [place], los beneficios y las otras alternativas. · Infórmeles a leonor médicos sobre [employer], las vitaminas, los suplementos y los raegan herbarios que dia. Algunos de [employer] aumentar el riesgo de sangrado o interactuar con la anestesia.     · Si dia anticoagulantes, tomasa warfarina (Coumadin), clopidogrel (Plavix) o aspirina, asegúrese de hablar con damon médico. Le dirá si debe dejar de armin estos medicamentos antes del procedimiento. Asegúrese de entender exactamente lo que damon médico quiere que armando.     · Damon médico le dirá qué medicamentos armin o dejar de armin antes del procedimiento. Es posible que deba dejar de armin ciertos medicamentos elissa semana o más antes del procedimiento, así que hable con damon médico tan pronto tomasa pueda. Los procedimientos pueden ser estresantes. Esta información le ayudará a entender qué puede esperar. Y le ayudará a prepararse de manera garcia para el procedimiento. ¿Qué ocurre el día del procedimiento?    · [de-identified] comer y beber tomasa lo hace normalmente.     · Báñese o dúchese antes de acudir al hospital para el procedimiento. No use lociones, perfumes, desodorantes ni esmalte de uñas.   · Vito vez desee armin un analgésico (medicamento para el dolor), tomasa ibuprofeno (Advil o Motrin), entre 30 y 60 minutos antes del procedimiento. [place] puede ayudar a reducir el dolor de los cólicos.     · Quítese todas las joyas y los \"piercings\".  Si lleva lentes de contacto, quíteselos también.    En el consultorio médico o el hospital   · [employer] un documento de identidad con foto.     · El anestesista la hará sentir cómoda y garcia. Es posible que se le dé un medicamento que la relajará o adormecerá ligeramente. La kacie que se va a tratar estará adormecida.     · El procedimiento durará entre 15 y 30 minutos, aproximadamente. El regreso a damon hogar  · Recibirá instrucciones más específicas acerca de la recuperación del procedimiento. Acesso F 935 alimentación, el cuidado de las heridas, la atención de seguimiento, el manejo de vehículos y la vuelta a damon rutina habitual.  ¿Cuándo debe llamar a damon médico?   · Tiene preguntas o inquietudes.     · No entiende cómo debe prepararse para el procedimiento.     · Se enferma antes del procedimiento (por ejemplo, tiene fiebre, un resfriado o gripe).     · Necesita reprogramar el procedimiento o cambió de opinión acerca de hacerse el procedimiento. ¿Dónde puede encontrar más información en inglés? Chelo Payton a http://danny-galo.info/. Luly Joseph U860 en la búsqueda para aprender más acerca de \"Procedimiento de escisión electroquirúrgica con asa: Antes del procedimiento - [ Loop Electrosurgical Excision Procedure (LEEP): Before Your Procedure ]. \"  Revisado: Andrew 66, 2018  Versión del contenido: 11.9  © 6888-7921 Healthwise, Incorporated. Las instrucciones de cuidado fueron adaptadas bajo licencia por [employer] (which disclaims liability or warranty for this information). Si usted tiene [place] afección médica o sobre estas instrucciones, siempre pregunte a damon profesional de oziel. Healthwise, Incorporated niega toda garantía o responsabilidad por damon uso de esta información.

## 2019-06-12 NOTE — PROCEDURES
LEEP procedure    Ramona Monroe is a 35 y.o. female who presents to the office today for a cervical LEEP procedure. LEEP is indicated because of LUIS III findings at colposcopy. The risks, benefits, and alternatives of the procedure were discussed, the patient's questions were answered and informed consent was obtained. A urine pregnancy test is negative. PROCEDURE:  The patient was placed in the dorsal lithotomy position and a grounding pad was placed on the patient's left thigh and connected to the Bovie device. The speculum exam revealed a multiparous cervix. There was no bleeding or discharge present. A LEEP coated speculum was placed into the vagina and the cervix was again visualized colposcopically. Colposcopy was repeated and there was a lesion visualized at 12 and 7. The cervix was injected with 10 cc's of 1% Lidocaine w/epinephrine. A satisfactory amount of time was given for the anesthetic to take effect. The patient tolerated the injections well with no untoward effects. Following this,  The LEEP generator was set at 50 lea cut and 40 lea coag. A medium loop was then used to take an ectocervical specimen in 2 sections (anterior and posterior lip). A separate endocervical specimen was also taken. An ECC was not performed. The ball electrode was used to obtain hemostasis. Monsels solution was not applied. The speculum was removed. The specimen was labeled, placed in formalin, and sent to the pathologist. The patient tolerated the LEEP procedure well. She was observed for 15 after the procedure. She was discharged from the office in stable condition.

## 2019-06-20 ENCOUNTER — DOCUMENTATION ONLY (OUTPATIENT)
Dept: FAMILY PLANNING/WOMEN'S HEALTH CLINIC | Age: 39
End: 2019-06-20

## 2019-06-20 NOTE — PROGRESS NOTES
Note from Dr. Sonia Truong asking about the plan of care after the result of her LEEP:    So, we will need to repeat pap smear with cotesting and ECC in 6 months. I am not as concerned with the endocervical margin being unclear (one side is clear and that is most likely the deep side as squamous lesions do not usually skip areas).  The CIS just means full thickness abnormal cells without any basement membrane penetration.  If this was noted during colposcopy biopsies, we would have done a CKC rather than LEEP.  The LEEP should be curative however. I talked to Dr. Velvet Serna of [facility] to go over the plan to make sure that I was not missing anything, and he was in total agreement. Please let me know if you have any other questions regarding this.

## 2019-06-26 ENCOUNTER — OFFICE VISIT (OUTPATIENT)
Dept: OBGYN CLINIC | Age: 39
End: 2019-06-26

## 2019-06-26 VITALS
RESPIRATION RATE: 18 BRPM | WEIGHT: 165.4 LBS | SYSTOLIC BLOOD PRESSURE: 104 MMHG | HEIGHT: 58 IN | BODY MASS INDEX: 34.72 KG/M2 | DIASTOLIC BLOOD PRESSURE: 70 MMHG | HEART RATE: 79 BPM

## 2019-06-26 DIAGNOSIS — Z09 POSTOPERATIVE EXAMINATION: Primary | ICD-10-CM

## 2019-06-26 NOTE — PROGRESS NOTES
35 y.o. y/o who presents for postoperative exam.  Pt is s/p LEEP on 6/12/19. Pt is doing well without complaints. Surgical pathology:      1. 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 Normal vagina.   Cervix healing well    Plan:  - Cleared of all restrictions  - cotesting in 6 month    Return to clinic in 6 months

## 2019-06-26 NOTE — PROGRESS NOTES
Chief Complaint   Patient presents with    Follow-up     LEEP     Pt here for LEEP follow up, denies any c/o.    3 most recent PHQ Screens 6/26/2019   Little interest or pleasure in doing things Not at all   Feeling down, depressed, irritable, or hopeless Not at all   Total Score PHQ 2 0     1. Have you been to the ER, urgent care clinic since your last visit? Hospitalized since your last visit? No    2. Have you seen or consulted any other health care providers outside of the [address] [place] [address] since your last visit? Include any pap smears or colon screening.  No

## 2019-07-15 ENCOUNTER — TELEPHONE (OUTPATIENT)
Dept: FAMILY MEDICINE CLINIC | Age: 39
End: 2019-07-15

## 2019-07-15 NOTE — TELEPHONE ENCOUNTER
Pt called asking for mammo results from 5/16/19. Caroljefe Fayphyllis Chart reviewed \"negative, recommend f/u mammogram 1 year\" reviewed w/ pt. Chart review shows gyn notes, LEEP, f/u recommended in 6 months w/ GYN. Reviewed w/ pt. Call assisted by [employer] phone  #[phone]. Routing to provider Eusebio PIMENTEL who saw at [facility] for [facility] clinic on 5/6/19.

## 2019-12-11 ENCOUNTER — OFFICE VISIT (OUTPATIENT)
Dept: OBGYN CLINIC | Age: 39
End: 2019-12-11

## 2019-12-11 VITALS — BODY MASS INDEX: 36.53 KG/M2 | RESPIRATION RATE: 18 BRPM | HEIGHT: 58 IN | WEIGHT: 174 LBS

## 2019-12-11 DIAGNOSIS — Z12.4 PAP SMEAR FOR CERVICAL CANCER SCREENING: Primary | ICD-10-CM

## 2019-12-11 DIAGNOSIS — A60.9 HSV (HERPES SIMPLEX VIRUS) ANOGENITAL INFECTION: ICD-10-CM

## 2019-12-11 DIAGNOSIS — N90.89 VULVAR LESION: ICD-10-CM

## 2019-12-11 DIAGNOSIS — D06.9 CIN III (CERVICAL INTRAEPITHELIAL NEOPLASIA GRADE III) WITH SEVERE DYSPLASIA: ICD-10-CM

## 2019-12-11 RX ORDER — ACYCLOVIR 400 MG/1
400 TABLET ORAL 3 TIMES DAILY
Qty: 15 TAB | Refills: 0 | Status: SHIPPED | OUTPATIENT
Start: 2019-12-11 | End: 2019-12-16

## 2019-12-11 NOTE — PROGRESS NOTES
35 y.o. for repeat pap smear    Patient with LEEP 6/2019. She had LUIS III at margins. She is here for follow up pap smear at 6 months. Patient with small lump with small lump on vulva. It has come back again. Was given medications and it went away but has returned. She is unsure of the medication    Review of symptoms:  Constitutional: negative  Urinary: negative  CV: negative    Neuro: negative  Resp: negative   Psych: negative  GI: negative    Musculoskeletal: negative  GYN: per HPI    Integumentary: negative    Physical exam:    Gen: AOx3, NAD  Resp: No respiratory distress  GYN: normal external genitalia other than 2 herpetic type lesions. Normal vagina.  Normal cervix    A/P  35 y.o. for repeat pap and with vulvar lesion  - pap smear with HPV today  - will treat for HSV outbreak with acyclovir 400mg TID for 5 days    RTC in 6 months

## 2019-12-11 NOTE — PROGRESS NOTES
Chief Complaint   Patient presents with    Well Woman     Pt presents in office for repeat pap smear. 3 most recent PHQ Screens 12/11/2019   Little interest or pleasure in doing things Not at all   Feeling down, depressed, irritable, or hopeless Not at all   Total Score PHQ 2 0     1. Have you been to the ER, urgent care clinic since your last visit? Hospitalized since your last visit? No    2. Have you seen or consulted any other health care providers outside of the [address] [place] [address] since your last visit? Include any pap smears or colon screening.  No

## 2019-12-14 LAB
CYTOLOGIST CVX/VAG CYTO: NORMAL
CYTOLOGY CVX/VAG DOC CYTO: NORMAL
CYTOLOGY CVX/VAG DOC THIN PREP: NORMAL
DX ICD CODE: NORMAL
HPV I/H RISK 1 DNA CVX QL PROBE+SIG AMP: NEGATIVE
Lab: NORMAL
OTHER STN SPEC: NORMAL
STAT OF ADQ CVX/VAG CYTO-IMP: NORMAL

## 2020-06-11 ENCOUNTER — OFFICE VISIT (OUTPATIENT)
Dept: OBGYN CLINIC | Age: 40
End: 2020-06-11

## 2020-06-11 VITALS
BODY MASS INDEX: 36.63 KG/M2 | WEIGHT: 174.5 LBS | SYSTOLIC BLOOD PRESSURE: 130 MMHG | DIASTOLIC BLOOD PRESSURE: 78 MMHG | HEIGHT: 58 IN

## 2020-06-11 DIAGNOSIS — Z23 ENCOUNTER FOR IMMUNIZATION: ICD-10-CM

## 2020-06-11 DIAGNOSIS — Z01.419 ENCOUNTER FOR GYNECOLOGICAL EXAMINATION WITHOUT ABNORMAL FINDING: Primary | ICD-10-CM

## 2020-06-11 NOTE — PROGRESS NOTES
After obtaining consent, and per orders of Dr. Kavin Rivers, injection of Gardasil 1/3 given by Tremayne Box MA. Patient instructed to remain in clinic for 20 minutes afterwards, and to report any adverse reaction to me immediately. Patient encouraged to make appointment today for next injection, Gardasil 2/3, due in 2 months.      Gardasil  : Revl  Site: Right deltoid  Route: Intramuscular  Dose: 0.5mL  Lot#: [id number]  Exp date: 09/23/2021  NDC: 7448-1287-42

## 2020-06-11 NOTE — PROGRESS NOTES
Annual exam    Tosin Jeffrey is a 38 y.o. presenting for annual exam.   Her main concerns today include nothing. Hx of abnormal paps and LEEP    Ob/Gyn Hx:    Patient's last menstrual period was 2020 (exact date). Menses- regular monthly cycles? yes, Bothersome? no  Contraception-not currently sexually active  STI- declined  SA-not currently sexually active    Health maintenance:  Pap-normal HPV negative 2019  Mammo-last year normal per patient. Colonoscopy-   Gardasil-0/3    Past Medical History:   Diagnosis Date    Abnormal Pap smear of cervix        Past Surgical History:   Procedure Laterality Date    HX LEEP PROCEDURE  2019       No family history on file.     Social History     Socioeconomic History    Marital status: SINGLE     Spouse name: Not on file    Number of children: Not on file    Years of education: Not on file    Highest education level: Not on file   Occupational History    Not on file   Social Needs    Financial resource strain: Not on file    Food insecurity     Worry: Not on file     Inability: Not on file    Transportation needs     Medical: Not on file     Non-medical: Not on file   Tobacco Use    Smoking status: Never Smoker    Smokeless tobacco: Never Used   Substance and Sexual Activity    Alcohol use: No    Drug use: No    Sexual activity: Not Currently     Partners: Male     Birth control/protection: Condom   Lifestyle    Physical activity     Days per week: Not on file     Minutes per session: Not on file    Stress: Not on file   Relationships    Social connections     Talks on phone: Not on file     Gets together: Not on file     Attends Restorationist service: Not on file     Active member of club or organization: Not on file     Attends meetings of clubs or organizations: Not on file     Relationship status: Not on file    Intimate partner violence     Fear of current or ex partner: Not on file     Emotionally abused: Not on file Physically abused: Not on file     Forced sexual activity: Not on file   Other Topics Concern    Not on file   Social History Narrative    Not on file       Current Outpatient Medications   Medication Sig Dispense Refill    hydrocortisone (ANUSOL-HC) 2.5 % rectal cream Insert  into rectum three (3) times daily.  TID for 1 week then as needed 30 g 2       No Known Allergies    Review of Systems - History obtained from the patient  Constitutional: negative for weight loss, fever, night sweats  HEENT: negative for hearing loss, earache, congestion, snoring, sorethroat  CV: negative for chest pain, palpitations, edema  Resp: negative for cough, shortness of breath, wheezing  GI: negative for change in bowel habits, abdominal pain, black or bloody stools  : negative for frequency, dysuria, hematuria, vaginal discharge  MSK: negative for back pain, joint pain, muscle pain  Breast: negative for breast lumps, nipple discharge, galactorrhea  Skin :negative for itching, rash, hives  Neuro: negative for dizziness, headache, confusion, weakness  Psych: negative for anxiety, depression, change in mood  Heme/lymph: negative for bleeding, bruising, pallor    Physical Exam    Visit Vitals  /78 (BP 1 Location: Right arm, BP Patient Position: Sitting)   Ht 4' 10\" (1.473 m)   Wt 174 lb 8 oz (79.2 kg)   LMP 05/18/2020 (Exact Date)   BMI 36.47 kg/m²       Constitutional  · Appearance: well-nourished, well developed, alert, in no acute distress    HENT  · Head and Face: appears normal    Neck  · Inspection/Palpation: normal appearance, no masses or tenderness  · Lymph Nodes: no lymphadenopathy present  · Thyroid: gland size normal, nontender, no nodules or masses present on palpation    Chest  · Respiratory Effort: non-labored breathing  · Auscultation: CTAB, normal breath sounds    Cardiovascular  · Heart:  · Auscultation: regular rate and rhythm without murmur  · Extremities: no peripheral edema    Breasts  · Inspection of Breasts: breasts symmetrical, no skin changes, no discharge present, nipple appearance normal, no skin retraction present  · Palpation of Breasts and Axillae: no masses present on palpation, no breast tenderness  · Axillary Lymph Nodes: no lymphadenopathy present    Gastrointestinal  · Abdominal Examination: abdomen non-tender to palpation, normal bowel sounds, no masses present  · Liver and spleen: no hepatomegaly present, spleen not palpable  · Hernias: no hernias identified    Genitourinary  · External Genitalia: normal appearance for age, no discharge present, no tenderness present, no inflammatory lesions present, no masses present, no atrophy present  · Vagina: normal vaginal vault without central or paravaginal defects, no discharge present, no inflammatory lesions present, no masses present  · Bladder: non-tender to palpation  · Urethra: appears normal  · Cervix: normal   · Uterus: normal size, shape and consistency  · Adnexa: no adnexal tenderness present, no adnexal masses present  · Perineum: perineum within normal limits, no evidence of trauma, no rashes or skin lesions present    Skin  · General Inspection: no rash, no lesions identified    Neurologic/Psychiatric  · Mental Status:  · Orientation: grossly oriented to person, place and time  · Mood and Affect: mood normal, affect appropriate      Assessment/Plan:  38 y.o. overall doing well.      Health Maintenance:  -counseled re: diet, exercise, healthy lifestyle  -pap/HPV sent  -STI testing     DECLINED  -Gardasil today  -refer for mammo    Contraception:  -reviewed contraceptive options including LARCs, declines    RTC: 1 year for annual wellness assessment, or sooner prn for problems or concerns  -handouts and instructions provided    Jackson Vidal  6/11/2020  9:15 AM     Signed By: Flo Thompson MD     June 11, 2020

## 2020-08-20 ENCOUNTER — HOSPITAL ENCOUNTER (OUTPATIENT)
Dept: MAMMOGRAPHY | Age: 40
Discharge: HOME | End: 2020-08-20

## 2020-08-20 ENCOUNTER — OFFICE VISIT (OUTPATIENT)
Dept: FAMILY PLANNING/WOMEN'S HEALTH CLINIC | Age: 40
End: 2020-08-20

## 2020-08-20 DIAGNOSIS — Z12.31 ENCOUNTER FOR MAMMOGRAM TO ESTABLISH BASELINE MAMMOGRAM: ICD-10-CM

## 2020-08-20 DIAGNOSIS — Z12.31 SCREENING MAMMOGRAM, ENCOUNTER FOR: Primary | ICD-10-CM

## 2020-08-20 NOTE — PROGRESS NOTES
EVERY WOMANS LIFE HISTORY QUESTIONNAIRE       No Yes Comments   Has a doctor ever seen or felt anything wrong with your breast? [x]                                  []                                     Have you ever had a breast biopsy? [x]                                  []                                          When and where was last mammogram performed? 5/16/2019 with [facility]    Have you ever been told that there was a problem on your mammogram?   No Yes Comments   [x]                                  []                                       Do you have breast implants? No Yes Comments   [x]                                  []                                       When was your last Pap test performed? 6/11/2020 by Dr. Pepe Boston     (5 yr test)  Have you ever had an abnormal Pap test?   No Yes Comments   []                                  [x]                                  Hx LUIS 3/CIS  with LEEP procedure  6/12/2019      Have you had a hysterectomy? No Yes Comments (why)   [x]                                  []                                       Have you been through menopause? No Yes Date of LMP   [x]                                  []                                  8/5/2020     Did your mother take YOSHI? No Yes Unknown   []                                  []                                  X     Do you have a history of HIV exposure? No Yes    [x]                                  []                                       Have you ever been diagnosed with any type of Cancer   No Yes Comments (type,when,where,type of treatment   [x]                                  []                                          Has a family member been diagnosed with breast or ovarian cancer?    No Yes Comments (which family members, and type   [x]                                  []                                       Are you taking hormone replacement therapy (HRT)     No Yes Comments   [x] []                                       How many times have you been pregnant? 3        Number of live births ? 3    Are you experiencing any of the following? No Yes Comments   Nipple Discharge [x]                                  []                                     Breast Lump/Masses [x]                                  []                                     Breast Skin Changes [x]                                  []                                          No Yes Comments   Vaginal Discharge [x]                                  []                                     Abnormal/unusual vaginal bleeding [x]                                  []                                         Are you experiencing any other health problems? None    3D mammo not working today and pt had to go to work so will return for 3d mammo        Age at first period? 12  Age at first birth?   11

## 2020-08-20 NOTE — PROGRESS NOTES
HISTORY OF PRESENT ILLNESS  Ova Nisha is a 38 y.o. female here for [facility]. Certified , Gregory Reddy, present for exam.  HPI MsAdrian Butler, [facility], denies abnormal SBE's, performs on occasion. She does have very dense breasts as noted per her mammogram last year. LMP 8/5/2020 with normal monthly cycles. She denies use of hormones or BC. UTD with Pap- completed earlier this year. Non-smoker. Review of Systems   Constitutional: Negative. Physical Exam  Constitutional:       Appearance: Normal appearance. Chest:      Breasts:         Right: Normal. No swelling, bleeding, inverted nipple, mass, nipple discharge, skin change or tenderness. Left: Normal. No swelling, bleeding, inverted nipple, mass, nipple discharge, skin change or tenderness. Lymphadenopathy:      Upper Body:      Right upper body: No supraclavicular, axillary or pectoral adenopathy. Left upper body: No supraclavicular, axillary or pectoral adenopathy. Skin:     General: Skin is warm and dry. Neurological:      Mental Status: She is alert and oriented to person, place, and time. Psychiatric:         Mood and Affect: Mood normal.         Behavior: Behavior normal.         ASSESSMENT and PLAN  1. [facility]  2. CBE benign       -H/O dense breasts  3.  Tomosynthesis/3D screening mammogram       -Pt will return for this when equipment fixed ([facility] will notify her)

## 2020-09-01 ENCOUNTER — HOSPITAL ENCOUNTER (OUTPATIENT)
Dept: MAMMOGRAPHY | Age: 40
Discharge: HOME | End: 2020-09-01
Attending: NURSE PRACTITIONER

## 2020-09-01 DIAGNOSIS — Z12.31 VISIT FOR SCREENING MAMMOGRAM: ICD-10-CM

## 2020-09-01 PROCEDURE — 77063 BREAST TOMOSYNTHESIS BI: CPT

## 2020-12-01 ENCOUNTER — OFFICE VISIT (OUTPATIENT)
Dept: OBGYN CLINIC | Age: 40
End: 2020-12-01

## 2021-07-01 NOTE — PROGRESS NOTES
Coordination of Care  1. Have you been to the ER, urgent care clinic since your last visit? Hospitalized since your last visit? No    2. Have you seen or consulted any other health care providers outside of the [address] [place] [address] since your last visit? Include any pap smears or colon screening. No    Does the patient need refills? NO    Learning Assessment Complete?  yes Repair Performed By Another Provider Text (Leave Blank If You Do Not Want): After the tissue was excised the defect was repaired by another provider.

## 2021-07-26 ENCOUNTER — OFFICE VISIT (OUTPATIENT)
Dept: OBGYN CLINIC | Age: 41
End: 2021-07-26

## 2021-07-27 ENCOUNTER — OFFICE VISIT (OUTPATIENT)
Dept: OBGYN CLINIC | Age: 41
End: 2021-07-27

## 2021-07-27 VITALS — WEIGHT: 164 LBS | BODY MASS INDEX: 34.28 KG/M2 | SYSTOLIC BLOOD PRESSURE: 132 MMHG | DIASTOLIC BLOOD PRESSURE: 82 MMHG

## 2021-07-27 DIAGNOSIS — Z01.419 ENCOUNTER FOR GYNECOLOGICAL EXAMINATION WITHOUT ABNORMAL FINDING: Primary | ICD-10-CM

## 2021-07-27 PROCEDURE — 99396 PREV VISIT EST AGE 40-64: CPT | Performed by: OBSTETRICS & GYNECOLOGY

## 2021-07-27 NOTE — PROGRESS NOTES
Annual exam    Serafin Faye is a 42 y.o. presenting for annual exam.   Her main concerns today include fatigue. Ob/Gyn Hx:    No LMP recorded. Menses- regular monthly cycles? yes, Bothersome? no  Contraception- not sexually active at this time. STI- declined  SA-yes    Health maintenance:  Pap-2020- normal and negative HPV  Mammo-2020- normal  Colonoscopy- N/A  Gardasil-N/A    Past Medical History:   Diagnosis Date    Abnormal Pap smear of cervix        Past Surgical History:   Procedure Laterality Date    HX LEEP PROCEDURE  2019       No family history on file. Social History     Socioeconomic History    Marital status: SINGLE     Spouse name: Not on file    Number of children: Not on file    Years of education: Not on file    Highest education level: Not on file   Occupational History    Not on file   Tobacco Use    Smoking status: Never Smoker    Smokeless tobacco: Never Used   Substance and Sexual Activity    Alcohol use: No    Drug use: No    Sexual activity: Not Currently     Partners: Male     Birth control/protection: Condom   Other Topics Concern    Not on file   Social History Narrative    Not on file     Social Determinants of Health     Financial Resource Strain:     Difficulty of Paying Living Expenses:    Food Insecurity:     Worried About Running Out of Food in the Last Year:     [address] in the Last Year:    Transportation Needs:     Lack of Transportation (Medical):      Lack of Transportation (Non-Medical):    Physical Activity:     Days of Exercise per Week:     Minutes of Exercise per Session:    Stress:     Feeling of Stress :    Social Connections:     Frequency of Communication with Friends and Family:     Frequency of Social Gatherings with Friends and Family:     Attends Confucianism Services:     Active Member of Clubs or Organizations:     Attends Club or Organization Meetings:     Marital Status:    Intimate Partner Violence:  Fear of Current or Ex-Partner:     Emotionally Abused:     Physically Abused:     Sexually Abused:            No Known Allergies      Physical Exam    There were no vitals taken for this visit.     Constitutional  · Appearance: well-nourished, well developed, alert, in no acute distress    HENT  · Head and Face: appears normal    Neck  · Inspection/Palpation: normal appearance, no masses or tenderness  · Lymph Nodes: no lymphadenopathy present  · Thyroid: gland size normal, nontender, no nodules or masses present on palpation    Chest  · Respiratory Effort: non-labored breathing  · Auscultation: CTAB, normal breath sounds    Cardiovascular  · Heart:  · Auscultation: regular rate and rhythm without murmur  · Extremities: no peripheral edema    Breasts  · Inspection of Breasts: breasts symmetrical, no skin changes, no discharge present, nipple appearance normal, no skin retraction present  · Palpation of Breasts and Axillae: no masses present on palpation, no breast tenderness  · Axillary Lymph Nodes: no lymphadenopathy present    Gastrointestinal  · Abdominal Examination: abdomen non-tender to palpation, normal bowel sounds, no masses present  · Liver and spleen: no hepatomegaly present, spleen not palpable  · Hernias: no hernias identified    Genitourinary  · External Genitalia: normal appearance for age, no discharge present, no tenderness present, no inflammatory lesions present, no masses present, no atrophy present  · Vagina: normal vaginal vault without central or paravaginal defects, no discharge present, no inflammatory lesions present, no masses present  · Bladder: non-tender to palpation  · Urethra: appears normal  · Cervix: normal  · Perineum: perineum within normal limits, no evidence of trauma, no rashes or skin lesions present    Skin  · General Inspection: no rash, no lesions identified    Neurologic/Psychiatric  · Mental Status:  · Orientation: grossly oriented to person, place and time  · Mood and Affect: mood normal, affect appropriate      Assessment/Plan:  42 y.o. overall doing well.      Health Maintenance:  -counseled re: diet, exercise, healthy lifestyle  -pap/HPV  -STI testing DECLINED  -Gardasil  -refer for mammo    RTC: 1 year for annual wellness assessment, or sooner prn for problems or concerns  -handouts and instructions provided    Uday Gustafson  7/27/2021  8:05 AM   Signed By: Benjy Zambrano MD     July 27, 2021

## 2021-07-27 NOTE — PATIENT INSTRUCTIONS
Examen pélvico: Instrucciones de cuidado  Pelvic Exam: Care Instructions  Generalidades     Cuando damon médico le examina los órganos pélvicos, esto se llama examen pélvico. Amena examen se hace para evaluar síntomas, tomasa dolor pélvico o sangrado o secreción anormales de la vagina. También puede hacerse para recolectar muestras de células para la detección de cáncer de tonya uterino. Antes de damon examen, es importante que comparta algo de información con damon médico. Usted puede hablar sobre cualquier inquietud que pueda tener. Damon médico también querrá saber si está embarazada o si [place] algún método anticonceptivo. Y damon médico querrá oír sobre cualquier problema, [place] o procedimiento que haya tenido en la kacie pélvica. También tendrá que decirle a damon médico cuándo fue damon último período menstrual.  La atención de seguimiento es elissa parte clave de damon tratamiento y seguridad. Asegúrese de hacer y acudir a todas las citas, y llame a damon médico si está teniendo problemas. También es elissa buena idea saber los resultados de leonor exámenes y mantener elissa lista de los medicamentos que dia. Cómo se hace un examen pélvico?  · Yadira un examen pélvico, usted:  ? Se quitará la ropa de la cintura para abajo. Recibirá elissa cubierta de papel o de sadia para colocarse sobre la mitad inferior del cuerpo. ? Se recostará boca arriba sobre elissa gaffney de exploración con los pies y las piernas apoyados en estribos. · El médico podría:  ? Pedirle que relaje las rodillas. Tiene que apuntar con las rodillas hacia afuera, [place] [id number]. ? Revisarle la abertura de la vagina para luis manuel si tiene llagas o hinchazón. ? Colocarle suavemente un instrumento llamado espéculo en la vagina. Le abre la vagina un poco. Usted puede sentir algo de presión. El espéculo le permite al médico luis manuel el interior de la vagina. ? Usar un pequeño cepillo, espátula o hisopo para obtener [place] para analizar. [place] retirará el espéculo. ?  Ponerse guantes e introducirle kell o dos dedos de elissa mano en la vagina. Le pone la otra mano encima de la parte inferior del abdomen. [place] le permite a damon médico palparle los [place]. Probablemente sienta algo de presión. ? Introducirle un dedo enguantado dentro del recto y kell en la vagina, si es necesario. [place] también puede ayudar a revisarle los Shahid [place]. Amena examen lleva alrededor de 10 minutos. Es posible que tenga elissa pequeña cantidad de flujo vaginal o sangrado después del examen. Por qué se hace un examen pélvico?  Un examen pélvico podría realizarse:  · Para recolectar muestras de células para la detección de cáncer de tonya uterino. · Para detectar elissa infección vaginal.  · Para detectar infecciones de transmisión sexual, tomasa clamidia o herpes. · Para ayudar a determinar la causa del sangrado uterino anormal.  · Para detectar problemas tomasa fibromas uterinos, quistes ováricos o prolapso uterino. · Para ayudar a encontrar la causa de un dolor pélvico o abdominal.  · Antes de colocar un dispositivo intrauterino (DIU). · Para recopilar pruebas de elissa agresión sexual.  
Cuáles son los riesgos de un examen pélvico?  Existe elissa pequeña posibilidad de que el médico encuentre algo en un examen pélvico que no hubiera causado un problema. [place] se llama sobrediagnóstico. Podría alea lugar a pruebas o tratamientos que no necesita. Cuándo debe pedir ayuda? Llame al 911 en cualquier momento que considere que necesita atención de [place]. Por ejemplo, llame si:    · Se desmayó (perdió el conocimiento).     · Tiene un dolor repentino e intenso en el abdomen o la pelvis.    Llame a damon médico ahora mismo o busque atención médica inmediata si:    · Tiene un dolor nuevo en el abdomen o la pelvis.     · Tiene fiebre y dolor pélvico o flujo vaginal.   Preste especial atención a los cambios en damon oziel y asegúrese de comunicarse con damon médico si:    · El dolor randi las relaciones sexuales [place].     · No mejora tomasa se esperaba. Dónde puede encontrar más información en inglés? Vaya a http://www.pickering.com/  Brenda Nunez [id number] en la búsqueda para aprender más acerca de \"Examen pélvico: Instrucciones de cuidado. \"  Revisado: 17 julio, 3235               XHULJUS del contenido: 12.8  © 2006-2021 Healthwise, Incorporated. Las instrucciones de cuidado fueron adaptadas bajo licencia por [employer] (which disclaims liability or warranty for this information). Si usted tiene [place] afección médica o sobre estas instrucciones, siempre pregunte a damon profesional de oziel. [employer] niega toda garantía o responsabilidad por damon uso de esta información.

## 2021-07-29 LAB
CYTOLOGIST CVX/VAG CYTO: NORMAL
CYTOLOGY CVX/VAG DOC CYTO: NORMAL
CYTOLOGY CVX/VAG DOC THIN PREP: NORMAL
DX ICD CODE: NORMAL
HPV I/H RISK 4 DNA CVX QL PROBE+SIG AMP: NEGATIVE
Lab: NORMAL
OTHER STN SPEC: NORMAL
STAT OF ADQ CVX/VAG CYTO-IMP: NORMAL

## 2021-09-16 ENCOUNTER — OFFICE VISIT (OUTPATIENT)
Dept: FAMILY PLANNING/WOMEN'S HEALTH CLINIC | Age: 41
End: 2021-09-16

## 2021-09-16 ENCOUNTER — HOSPITAL ENCOUNTER (OUTPATIENT)
Dept: MAMMOGRAPHY | Age: 41
Discharge: HOME | End: 2021-09-16
Attending: NURSE PRACTITIONER

## 2021-09-16 DIAGNOSIS — Z12.31 BREAST CANCER SCREENING BY MAMMOGRAM: ICD-10-CM

## 2021-09-16 DIAGNOSIS — Z12.31 SCREENING MAMMOGRAM, ENCOUNTER FOR: Primary | ICD-10-CM

## 2021-09-16 PROCEDURE — 77063 BREAST TOMOSYNTHESIS BI: CPT

## 2021-09-16 NOTE — PROGRESS NOTES
HISTORY OF PRESENT ILLNESS  Tawnya Jones is a 42 y.o. female here for [facility]. HPI Ms. Christina Mercado, [id number], denies abnormal SBE's, performs routinely. She has dense breasts. She is UTD with her Pap- cotest completed in 2020 with normal results and had a WWE in 2021. LMP 9/9/2021. Denies use of hormones. Review of Systems   Constitutional: Negative. Respiratory: Negative. Cardiovascular: Negative. Gastrointestinal: Negative. Genitourinary: Negative. Skin: Negative. Physical Exam  Nursing note reviewed. Constitutional:       Appearance: Normal appearance. Chest:      Breasts:         Right: No swelling, bleeding, inverted nipple, mass, nipple discharge, skin change or tenderness. Left: No swelling, bleeding, inverted nipple, mass, nipple discharge, skin change or tenderness. Lymphadenopathy:      Upper Body:      Right upper body: No supraclavicular, axillary or pectoral adenopathy. Left upper body: No supraclavicular, axillary or pectoral adenopathy. Skin:     General: Skin is warm and dry. Neurological:      Mental Status: She is alert and oriented to person, place, and time. Psychiatric:         Mood and Affect: Mood normal.         Behavior: Behavior normal.         Thought Content: Thought content normal.         Judgment: Judgment normal.         ASSESSMENT and PLAN  1. [facility]  2. CBE benign  3. Screening 3D mammogram today      -heterogeneously dense breasts  4.  Followed by Dr. Ifeanyi Dow for pap's/WWE

## 2021-09-16 NOTE — PROGRESS NOTES
EVERY WOMANS LIFE HISTORY QUESTIONNAIRE       No Yes Comments   Has a doctor ever seen or felt anything wrong with your breast? [x]                                  []                                     Have you ever had a breast biopsy? [x]                                  []                                          When and where was last mammogram performed?  09/2020 -[facility]    Have you ever been told that there was a problem on your mammogram?   No Yes Comments   [x]                                  []                                       Do you have breast implants? No Yes Comments   [x]                                  []                                       When was your last Pap test performed? 07/2021 Dr Franky Matias    Have you ever had an abnormal Pap test?   No Yes Comments   []                                  [x]                                  Had abnormal pap smears in the past- had burning of the cells about 2 years ago goes to Dr. Franky Matias for follow up. Have you had a hysterectomy? No Yes Comments (why)   [x]                                  []                                       Have you been through menopause? No Yes Date of LMP   [x]                                  []                                  9/9/21     Did your mother take YOSHI? No Yes Unknown   []                                  []                                  x     Do you have a history of HIV exposure? No Yes    [x]                                  []                                       Have you ever been diagnosed with any type of Cancer   No Yes Comments (type,when,where,type of treatment   [x]                                  []                                          Has a family member been diagnosed with breast or ovarian cancer?    No Yes Comments (which family members, and type   [x]                                  []                                       Are you taking hormone replacement therapy (HRT)     No Yes Comments   [x]                                  []                                       How many times have you been pregnant? 3        Number of live births ? 3    Are you experiencing any of the following? No Yes Comments   Nipple Discharge [x]                                  []                                     Breast Lump/Masses [x]                                  []                                     Breast Skin Changes [x]                                  []                                          No Yes Comments   Vaginal Discharge [x]                                  []                                     Abnormal/unusual vaginal bleeding [x]                                  []                                         Are you experiencing any other health problems? Goes to [facility]      Age at first period? 14  Age at first birth?  11

## 2021-09-22 DIAGNOSIS — N64.89 BREAST ASYMMETRY: Primary | ICD-10-CM

## 2021-10-21 ENCOUNTER — HOSPITAL ENCOUNTER (OUTPATIENT)
Dept: MAMMOGRAPHY | Age: 41
Discharge: HOME | End: 2021-10-21
Attending: NURSE PRACTITIONER

## 2021-10-21 DIAGNOSIS — N64.89 BREAST ASYMMETRY: ICD-10-CM

## 2021-10-21 PROCEDURE — 76642 ULTRASOUND BREAST LIMITED: CPT

## 2021-10-21 PROCEDURE — 77061 BREAST TOMOSYNTHESIS UNI: CPT

## 2022-03-19 PROBLEM — E66.01 SEVERE OBESITY (HCC): Status: ACTIVE | Noted: 2019-02-04

## 2022-08-01 ENCOUNTER — TELEPHONE (OUTPATIENT)
Dept: FAMILY PLANNING/WOMEN'S HEALTH CLINIC | Age: 42
End: 2022-08-01

## 2022-08-01 NOTE — TELEPHONE ENCOUNTER
Please review patient's cervical history (had LEEP in 06/2019, last pap smear 07/2021) and advised when her next pap smear should be. Thank you    Patient next scheduled appt. With [facility] will be 11/12/2022.      Thank you,

## 2022-10-20 ENCOUNTER — TRANSCRIBE ORDER (OUTPATIENT)
Dept: SCHEDULING | Age: 42
End: 2022-10-20

## 2022-10-20 DIAGNOSIS — Z12.31 VISIT FOR SCREENING MAMMOGRAM: Primary | ICD-10-CM

## 2022-10-28 ENCOUNTER — TELEPHONE (OUTPATIENT)
Dept: FAMILY PLANNING/WOMEN'S HEALTH CLINIC | Age: 42
End: 2022-10-28

## 2022-10-28 NOTE — TELEPHONE ENCOUNTER
Chart reviewed: It is noted that patient has an appt. With [facility] for 11/12/2022 for both screening mammogram and pap smear. It is also noted that she has a mammogram appt. 11/17/2022. If patient is coming to appt. On 11/12/2022 appt. On 11/17/2022 is not needed (not covered by [facility]) pt has to come in to yearly [facility] visit. Maura Puckett RN    Nurse calling patient to make her aware and possibly cancel the 11/17/2022 appt. No answer. Left message to return phone call.

## 2022-10-31 ENCOUNTER — TELEPHONE (OUTPATIENT)
Dept: FAMILY PLANNING/WOMEN'S HEALTH CLINIC | Age: 42
End: 2022-10-31

## 2022-10-31 NOTE — TELEPHONE ENCOUNTER
Patient was called to notify that on Nov 12 there will be no provider available 2x with no success. Only mammogram will be done. A voicemail was left if she would like to reschedule, the screening line number as well as the [facility] main office line was provided in voicemail.

## 2022-11-08 NOTE — TELEPHONE ENCOUNTER
Calling patient to let her know about the 2 appt. For screening mammograms on 11/12/22 and 11/17/22. No answer, left message on 2nd attempt and a text message was sent asking to call our main office.

## 2022-11-17 ENCOUNTER — HOSPITAL ENCOUNTER (OUTPATIENT)
Dept: MAMMOGRAPHY | Age: 42
Discharge: HOME | End: 2022-11-17

## 2022-11-17 ENCOUNTER — OFFICE VISIT (OUTPATIENT)
Dept: FAMILY PLANNING/WOMEN'S HEALTH CLINIC | Age: 42
End: 2022-11-17

## 2022-11-17 DIAGNOSIS — Z01.419 ENCOUNTER FOR WELL WOMAN EXAM: Primary | ICD-10-CM

## 2022-11-17 DIAGNOSIS — Z12.31 VISIT FOR SCREENING MAMMOGRAM: ICD-10-CM

## 2022-11-17 PROCEDURE — 77063 BREAST TOMOSYNTHESIS BI: CPT

## 2022-11-17 NOTE — PROGRESS NOTES
EVERY WOMANS LIFE HISTORY QUESTIONNAIRE       No Yes Comments   Has a doctor ever seen or felt anything wrong with your breast? [x]                                  []                                     Have you ever had a breast biopsy? [x]                                  []                                          When and where was last mammogram performed? 9/16/2021 [facility]    Have you ever been told that there was a problem on your mammogram?   No Yes Comments   [x]                                  []                                       Do you have breast implants? No Yes Comments   [x]                                  []                                  .     When was your last Pap test performed? 7/27/2021 . Pt declined [facility] pap. Have you ever had an abnormal Pap test?   No Yes Comments   [x]                                  []                                       Have you had a hysterectomy? No Yes Comments (why)   [x]                                  []                                       Have you been through menopause? No Yes Date of LMP   [x]                                  []                                  10/5/2022 hx of irregular menses     Did your mother take YOSHI? No Yes Unknown   []                                  []                                  x     Do you have a history of HIV exposure? No Yes    [x]                                  []                                       Have you ever been diagnosed with any type of Cancer   No Yes Comments (type,when,where,type of treatment   [x]                                  []                                          Has a family member been diagnosed with breast or ovarian cancer?    No Yes Comments (which family members, and type   [x]                                  []                                       Are you taking hormone replacement therapy (HRT)     No Yes Comments   [x]                                  [] How many times have you been pregnant? 12    Number of live births ? 16    Are you experiencing any of the following? No Yes Comments   Nipple Discharge [x]                                  []                                     Breast Lump/Masses [x]                                  []                                     Breast Skin Changes [x]                                  []                                          No Yes Comments   Vaginal Discharge [x]                                  []                                     Abnormal/unusual vaginal bleeding [x]                                  []                                         Are you experiencing any other health problems? Age at first period? 14  Age at first birth? 11    Ht--4'10\"    Wt--? [facility]  number is [phone].

## 2022-11-17 NOTE — PROGRESS NOTES
Assessment/Plan:    Diagnoses and all orders for this visit:    1. Encounter for well woman exam      Pt presents for annual well woman exam, she has no complaints and PE is normal      ALISA Joseph expressed understanding of this plan. An AVS was printed and given to the patient.      ----------------------------------------------------------------------    Chief Complaint   Patient presents with    Well Woman       History of Present Illness:  [facility]  No breast complaints or concerns  Having monthly periods. Not using any form of birth control  States that she is single, not in a relationship  She denies any risk of DV     Had dx imaging last year which confirmed benign nature of breast mass- cystic       Past Medical History:   Diagnosis Date    Abnormal Pap smear of cervix            No Known Allergies    Social History     Tobacco Use    Smoking status: Never    Smokeless tobacco: Never   Vaping Use    Vaping Use: Never used   Substance Use Topics    Alcohol use: No    Drug use: No       History reviewed. No pertinent family history.     Physical Exam:     Visit Vitals  LMP 10/05/2022       A&Ox3  [facility] NAD  Respirations normal and non labored  Breast exam- amy neg for mass, tenderness, skin color changes, dimpling, retractions or nipple changes

## 2023-01-27 ENCOUNTER — TELEPHONE (OUTPATIENT)
Dept: FAMILY PLANNING/WOMEN'S HEALTH CLINIC | Age: 43
End: 2023-01-27

## 2023-01-27 NOTE — TELEPHONE ENCOUNTER
Reviewing patient chart it is noted that she may be due for a pap smear. Pt came to our [facility] [address] [facility] 11/2022 but pap smear declined pap smear at that time. Due to pt's hx of LEEP in 2019- an additional pap smear is needed  and result need to be normal /hpv negative before moving on to 3 year follow up per [facility] as per tel. Encounter note on 8/1/2022. Nurse called patient - no answer left message to call us back.  Amado Go RN

## 2023-01-27 NOTE — TELEPHONE ENCOUNTER
Patient called back and previous message reviewed with patient- pt has been scheduled to return for pap smear. Pt thankful for follow up. Lory Belcher RN  Only able to have appt. On thursdays, does not have car.

## 2023-02-23 ENCOUNTER — OFFICE VISIT (OUTPATIENT)
Dept: FAMILY PLANNING/WOMEN'S HEALTH CLINIC | Age: 43
End: 2023-02-23

## 2023-02-23 ENCOUNTER — HOSPITAL ENCOUNTER (OUTPATIENT)
Facility: HOSPITAL | Age: 43
Setting detail: SPECIMEN
Discharge: HOME | End: 2023-02-26

## 2023-02-23 DIAGNOSIS — R87.619 ABNORMAL CERVICAL PAPANICOLAOU SMEAR, UNSPECIFIED ABNORMAL PAP FINDING: ICD-10-CM

## 2023-02-23 DIAGNOSIS — Z98.890 HX OF COLPOSCOPY WITH CERVICAL BIOPSY: Primary | ICD-10-CM

## 2023-02-23 PROCEDURE — 88175 CYTOPATH C/V AUTO FLUID REDO: CPT

## 2023-02-23 PROCEDURE — 87624 HPV HI-RISK TYP POOLED RSLT: CPT

## 2023-02-23 NOTE — PROGRESS NOTES
Assessment/Plan:    Diagnoses and all orders for this visit:    1. Hx of colposcopy with cervical biopsy    2. Abnormal cervical Papanicolaou smear, unspecified abnormal pap finding    Please confirm but based on ASCCP guidelines for the following, the next pap will be in 3 years if this pap result is neg cytology, neg HPV    2018 ASCUS can not rule out HGSIL  2019 LEEP LUIS 2-3 margins (path report in system)  2019 f/up pap neg cytology, neg HPV   f/up pap neg cytology, neg HPV   f/up pap neg cytology, neg HPV    So if this pap is neg, then she will move to q 3 year pap         [facility]. An AVS was printed and given to the patient.      ----------------------------------------------------------------------    No chief complaint on file. History of Present Illness:    [facility] pap/pelvic appt  Having regular periods, sometimes painful but not heavy    In a relationship and she is sexually active, not using birth control. No risk of DV  UTD on mammogram      Past Medical History:   Diagnosis Date    Abnormal Pap smear of cervix            No Known Allergies    Social History     Tobacco Use    Smoking status: Never    Smokeless tobacco: Never   Vaping Use    Vaping Use: Never used   Substance Use Topics    Alcohol use: No    Drug use: No       No family history on file. Physical Exam:     There were no vitals taken for this visit. A&Ox3  [facility] NAD  Respirations normal and non labored  Pelvic- ext neg for lesion or discharge. Cervix and vagina w/out lesion or discharge.  Uterus with out mass or tenderness

## 2023-03-08 ENCOUNTER — DOCUMENTATION ONLY (OUTPATIENT)
Dept: FAMILY MEDICINE CLINIC | Age: 43
End: 2023-03-08

## 2023-03-08 NOTE — PROGRESS NOTES
Pap result review- pap result is scanned into media section  Pap shows negative cytology and neg HPV  See last office note for pap hx  Based on the history of having had colposcopy, her next pap will be in 3 years   Please inform the pt

## 2024-02-01 ENCOUNTER — HOSPITAL ENCOUNTER (EMERGENCY)
Facility: HOSPITAL | Age: 44
Discharge: HOME | End: 2024-02-02
Attending: STUDENT IN AN ORGANIZED HEALTH CARE EDUCATION/TRAINING PROGRAM

## 2024-02-01 VITALS
TEMPERATURE: 97.5 F | SYSTOLIC BLOOD PRESSURE: 156 MMHG | HEART RATE: 93 BPM | DIASTOLIC BLOOD PRESSURE: 89 MMHG | RESPIRATION RATE: 18 BRPM | OXYGEN SATURATION: 97 %

## 2024-02-01 DIAGNOSIS — L91.0 KELOID SCAR DUE TO BURN: ICD-10-CM

## 2024-02-01 DIAGNOSIS — M79.601 RIGHT ARM PAIN: Primary | ICD-10-CM

## 2024-02-01 PROCEDURE — 6360000002 HC RX W HCPCS

## 2024-02-01 PROCEDURE — 6370000000 HC RX 637 (ALT 250 FOR IP)

## 2024-02-01 PROCEDURE — 90471 IMMUNIZATION ADMIN: CPT

## 2024-02-01 PROCEDURE — 99284 EMERGENCY DEPT VISIT MOD MDM: CPT

## 2024-02-01 PROCEDURE — 90714 TD VACC NO PRESV 7 YRS+ IM: CPT

## 2024-02-01 RX ORDER — BENZOCAINE/MENTHOL 6 MG-10 MG
LOZENGE MUCOUS MEMBRANE
Qty: 30 G | Refills: 1 | Status: SHIPPED | OUTPATIENT
Start: 2024-02-01 | End: 2024-02-08

## 2024-02-01 RX ORDER — IBUPROFEN 400 MG/1
600 TABLET ORAL
Status: COMPLETED | OUTPATIENT
Start: 2024-02-01 | End: 2024-02-01

## 2024-02-01 RX ORDER — TETANUS AND DIPHTHERIA TOXOIDS ADSORBED 2; 2 [LF]/.5ML; [LF]/.5ML
0.5 INJECTION INTRAMUSCULAR ONCE
Status: COMPLETED | OUTPATIENT
Start: 2024-02-01 | End: 2024-02-01

## 2024-02-01 RX ORDER — ACETAMINOPHEN 325 MG/1
650 TABLET ORAL
Status: COMPLETED | OUTPATIENT
Start: 2024-02-01 | End: 2024-02-01

## 2024-02-01 RX ADMIN — TETANUS AND DIPHTHERIA TOXOIDS ADSORBED 0.5 ML: 2; 2 INJECTION INTRAMUSCULAR at 23:53

## 2024-02-01 RX ADMIN — ACETAMINOPHEN 650 MG: 325 TABLET ORAL at 23:52

## 2024-02-01 RX ADMIN — IBUPROFEN 600 MG: 400 TABLET, FILM COATED ORAL at 23:52

## 2024-02-01 ASSESSMENT — PAIN DESCRIPTION - LOCATION: LOCATION: ARM

## 2024-02-01 ASSESSMENT — PAIN SCALES - GENERAL: PAINLEVEL_OUTOF10: 7

## 2024-02-01 ASSESSMENT — PAIN DESCRIPTION - PAIN TYPE: TYPE: ACUTE PAIN

## 2024-02-01 ASSESSMENT — PAIN - FUNCTIONAL ASSESSMENT
PAIN_FUNCTIONAL_ASSESSMENT: 0-10
PAIN_FUNCTIONAL_ASSESSMENT: ACTIVITIES ARE NOT PREVENTED

## 2024-02-01 ASSESSMENT — PAIN DESCRIPTION - DESCRIPTORS: DESCRIPTORS: BURNING

## 2024-02-01 ASSESSMENT — PAIN DESCRIPTION - ONSET: ONSET: ON-GOING

## 2024-02-01 ASSESSMENT — PAIN DESCRIPTION - ORIENTATION: ORIENTATION: RIGHT

## 2024-02-01 ASSESSMENT — PAIN DESCRIPTION - FREQUENCY: FREQUENCY: CONTINUOUS

## 2024-02-02 NOTE — ED TRIAGE NOTES
Pt ambulatory to triage co burning her arm about 2 months ago. Pt states she let the wound heal on its own, but is noticing that it is swollen and not healing properly. On assessment, patient RUE is tender to touch, described as burning, and Is hard.

## 2024-02-02 NOTE — ED PROVIDER NOTES
(electronically signed)    (Please note that parts of this dictation were completed with voice recognition software. Quite often unanticipated grammatical, syntax, homophones, and other interpretive errors are inadvertently transcribed by the computer software. Please disregards these errors. Please excuse any errors that have escaped final proofreading.)     Nemo Zimmerman, JULIÁN - NP  02/02/24 0002

## 2024-02-02 NOTE — ED NOTES
Patient given discharge instructions in english and [place]. No questions or concerns at this time. Patient vital signs stable and in no acute distress. Patient ambulatory at discharge.

## 2024-02-02 NOTE — DISCHARGE INSTRUCTIONS
Thank you for allowing me to care for you in the emergency department. It is my goal to provide you with excellent care.  Please fill out the survey that will come to you by mail or email since we listen to your feedback!     Below you will find a list of your tests from today's visit.  Should you have any questions, please do not hesitate to call the emergency department.    Labs  No results found for this or any previous visit (from the past 12 hour(s)).    Radiologic Studies  No orders to display     ------------------------------------------------------------------------------------------------------------  The exam and treatment you received in the Emergency Department were for an urgent problem and are not intended as complete care. It is important that you follow-up with a doctor, nurse practitioner, or physician assistant to:  (1) confirm your diagnosis,  (2) re-evaluation of changes in your illness and treatment, and (3) for ongoing care. Please take your discharge instructions with you when you go to your follow-up appointment.     If you have any problem arranging a follow-up appointment, contact the Emergency Department.  If your symptoms become worse or you do not improve as expected and you are unable to reach your health care provider, please return to the Emergency Department. We are available 24 hours a day.   Return to the ER for numbness, weakness, fever, chest pain, difficulty breathing, color change, severe pain.    If a prescription has been provided, please have it filled as soon as possible to prevent a delay in treatment. If you have any questions or reservations about taking the medication due to side effects or interactions with other medications, please call your primary care provider or contact the ER.

## 2024-02-06 ENCOUNTER — HOSPITAL ENCOUNTER (EMERGENCY)
Facility: HOSPITAL | Age: 44
Discharge: HOME | End: 2024-02-06
Attending: EMERGENCY MEDICINE

## 2024-02-06 VITALS
RESPIRATION RATE: 16 BRPM | TEMPERATURE: 98.3 F | OXYGEN SATURATION: 96 % | HEART RATE: 88 BPM | WEIGHT: 169.75 LBS | SYSTOLIC BLOOD PRESSURE: 127 MMHG | DIASTOLIC BLOOD PRESSURE: 82 MMHG

## 2024-02-06 DIAGNOSIS — T22.231A PARTIAL THICKNESS BURN OF RIGHT UPPER ARM, INITIAL ENCOUNTER: Primary | ICD-10-CM

## 2024-02-06 PROCEDURE — 99283 EMERGENCY DEPT VISIT LOW MDM: CPT

## 2024-02-06 RX ORDER — NAPROXEN 500 MG/1
500 TABLET ORAL 2 TIMES DAILY
Qty: 20 TABLET | Refills: 0 | Status: SHIPPED | OUTPATIENT
Start: 2024-02-06

## 2024-02-06 RX ORDER — TETANUS AND DIPHTHERIA TOXOIDS ADSORBED 2; 2 [LF]/.5ML; [LF]/.5ML
0.5 INJECTION INTRAMUSCULAR ONCE
Status: DISCONTINUED | OUTPATIENT
Start: 2024-02-06 | End: 2024-02-06

## 2024-02-06 NOTE — ED PROVIDER NOTES
There were no vitals filed for this visit.      Medical Decision Making          REASSESSMENT          CONSULTS:  None    PROCEDURES:     Procedures            (Please note that portions of this note were completed with a voice recognition program.  Efforts were made to edit the dictations but occasionally words are mis-transcribed.)    Harsha Snell MD (electronically signed)  Emergency Attending Physician              Harsha Snell MD  02/06/24 1016
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
## 2024-02-09 ENCOUNTER — TELEPHONE (OUTPATIENT)
Age: 44
End: 2024-02-09
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
## 2024-02-25 ENCOUNTER — HOSPITAL ENCOUNTER (EMERGENCY)
Facility: HOSPITAL | Age: 44
Discharge: HOME | End: 2024-02-25
Attending: EMERGENCY MEDICINE

## 2024-02-25 VITALS
TEMPERATURE: 98.2 F | WEIGHT: 165.34 LBS | OXYGEN SATURATION: 98 % | HEART RATE: 92 BPM | SYSTOLIC BLOOD PRESSURE: 117 MMHG | DIASTOLIC BLOOD PRESSURE: 77 MMHG | RESPIRATION RATE: 18 BRPM

## 2024-02-25 DIAGNOSIS — R79.89 ELEVATED TSH: ICD-10-CM

## 2024-02-25 DIAGNOSIS — R53.83 FATIGUE, UNSPECIFIED TYPE: Primary | ICD-10-CM

## 2024-02-25 LAB
ALBUMIN SERPL-MCNC: 3.9 G/DL (ref 3.5–5)
ALBUMIN/GLOB SERPL: 1.1 (ref 1.1–2.2)
ALP SERPL-CCNC: 100 U/L (ref 45–117)
ALT SERPL-CCNC: 45 U/L (ref 12–78)
ANION GAP SERPL CALC-SCNC: 7 MMOL/L (ref 5–15)
APPEARANCE UR: CLEAR
AST SERPL-CCNC: 15 U/L (ref 15–37)
BACTERIA URNS QL MICRO: ABNORMAL /HPF
BASOPHILS # BLD: 0 K/UL (ref 0–0.1)
BASOPHILS NFR BLD: 0 % (ref 0–1)
BILIRUB SERPL-MCNC: 0.5 MG/DL (ref 0.2–1)
BILIRUB UR QL: NEGATIVE
BUN SERPL-MCNC: 11 MG/DL (ref 6–20)
BUN/CREAT SERPL: 18 (ref 12–20)
CALCIUM SERPL-MCNC: 9.2 MG/DL (ref 8.5–10.1)
CHLORIDE SERPL-SCNC: 107 MMOL/L (ref 97–108)
CO2 SERPL-SCNC: 26 MMOL/L (ref 21–32)
COLOR UR: ABNORMAL
COMMENT:: NORMAL
CREAT SERPL-MCNC: 0.61 MG/DL (ref 0.55–1.02)
DIFFERENTIAL METHOD BLD: NORMAL
EKG ATRIAL RATE: 85 BPM
EKG DIAGNOSIS: NORMAL
EKG P AXIS: 54 DEGREES
EKG P-R INTERVAL: 144 MS
EKG Q-T INTERVAL: 348 MS
EKG QRS DURATION: 76 MS
EKG QTC CALCULATION (BAZETT): 414 MS
EKG R AXIS: 71 DEGREES
EKG T AXIS: 34 DEGREES
EKG VENTRICULAR RATE: 85 BPM
EOSINOPHIL # BLD: 0.1 K/UL (ref 0–0.4)
EOSINOPHIL NFR BLD: 1 % (ref 0–7)
EPITH CASTS URNS QL MICRO: ABNORMAL /LPF
ERYTHROCYTE [DISTWIDTH] IN BLOOD BY AUTOMATED COUNT: 12.5 % (ref 11.5–14.5)
EST. AVERAGE GLUCOSE BLD GHB EST-MCNC: 120 MG/DL
GLOBULIN SER CALC-MCNC: 3.6 G/DL (ref 2–4)
GLUCOSE SERPL-MCNC: 137 MG/DL (ref 65–100)
GLUCOSE UR STRIP.AUTO-MCNC: NEGATIVE MG/DL
HBA1C MFR BLD: 5.8 % (ref 4–5.6)
HCT VFR BLD AUTO: 39.8 % (ref 35–47)
HGB BLD-MCNC: 13.5 G/DL (ref 11.5–16)
HGB UR QL STRIP: NEGATIVE
IMM GRANULOCYTES # BLD AUTO: 0 K/UL (ref 0–0.04)
IMM GRANULOCYTES NFR BLD AUTO: 0 % (ref 0–0.5)
KETONES UR QL STRIP.AUTO: NEGATIVE MG/DL
LEUKOCYTE ESTERASE UR QL STRIP.AUTO: NEGATIVE
LYMPHOCYTES # BLD: 2.8 K/UL (ref 0.8–3.5)
LYMPHOCYTES NFR BLD: 27 % (ref 12–49)
MCH RBC QN AUTO: 32.1 PG (ref 26–34)
MCHC RBC AUTO-ENTMCNC: 33.9 G/DL (ref 30–36.5)
MCV RBC AUTO: 94.8 FL (ref 80–99)
MONOCYTES # BLD: 0.9 K/UL (ref 0–1)
MONOCYTES NFR BLD: 9 % (ref 5–13)
NEUTS SEG # BLD: 6.5 K/UL (ref 1.8–8)
NEUTS SEG NFR BLD: 63 % (ref 32–75)
NITRITE UR QL STRIP.AUTO: NEGATIVE
NRBC # BLD: 0 K/UL (ref 0–0.01)
NRBC BLD-RTO: 0 PER 100 WBC
PH UR STRIP: 6 (ref 5–8)
PLATELET # BLD AUTO: 257 K/UL (ref 150–400)
PMV BLD AUTO: 11.3 FL (ref 8.9–12.9)
POTASSIUM SERPL-SCNC: 3.5 MMOL/L (ref 3.5–5.1)
PROT SERPL-MCNC: 7.5 G/DL (ref 6.4–8.2)
PROT UR STRIP-MCNC: NEGATIVE MG/DL
RBC # BLD AUTO: 4.2 M/UL (ref 3.8–5.2)
RBC #/AREA URNS HPF: ABNORMAL /HPF (ref 0–5)
SODIUM SERPL-SCNC: 140 MMOL/L (ref 136–145)
SP GR UR REFRACTOMETRY: 1.01 (ref 1–1.03)
SPECIMEN HOLD: NORMAL
SPECIMEN HOLD: NORMAL
T3FREE SERPL-MCNC: 2.4 PG/ML (ref 2.2–4)
T4 FREE SERPL-MCNC: 0.8 NG/DL (ref 0.8–1.5)
TSH SERPL DL<=0.05 MIU/L-ACNC: 4.05 UIU/ML (ref 0.36–3.74)
UROBILINOGEN UR QL STRIP.AUTO: 0.2 EU/DL (ref 0.2–1)
WBC # BLD AUTO: 10.4 K/UL (ref 3.6–11)
WBC URNS QL MICRO: ABNORMAL /HPF (ref 0–4)

## 2024-02-25 PROCEDURE — 2580000003 HC RX 258: Performed by: FAMILY MEDICINE

## 2024-02-25 PROCEDURE — 83036 HEMOGLOBIN GLYCOSYLATED A1C: CPT

## 2024-02-25 PROCEDURE — 93005 ELECTROCARDIOGRAM TRACING: CPT | Performed by: FAMILY MEDICINE

## 2024-02-25 PROCEDURE — 80053 COMPREHEN METABOLIC PANEL: CPT

## 2024-02-25 PROCEDURE — 99284 EMERGENCY DEPT VISIT MOD MDM: CPT

## 2024-02-25 PROCEDURE — 36415 COLL VENOUS BLD VENIPUNCTURE: CPT

## 2024-02-25 PROCEDURE — 84443 ASSAY THYROID STIM HORMONE: CPT

## 2024-02-25 PROCEDURE — 85025 COMPLETE CBC W/AUTO DIFF WBC: CPT

## 2024-02-25 PROCEDURE — 93010 ELECTROCARDIOGRAM REPORT: CPT | Performed by: SPECIALIST

## 2024-02-25 PROCEDURE — 84439 ASSAY OF FREE THYROXINE: CPT

## 2024-02-25 PROCEDURE — 84481 FREE ASSAY (FT-3): CPT

## 2024-02-25 PROCEDURE — 81001 URINALYSIS AUTO W/SCOPE: CPT

## 2024-02-25 RX ORDER — 0.9 % SODIUM CHLORIDE 0.9 %
1000 INTRAVENOUS SOLUTION INTRAVENOUS ONCE
Status: COMPLETED | OUTPATIENT
Start: 2024-02-25 | End: 2024-02-25

## 2024-02-25 RX ADMIN — SODIUM CHLORIDE 1000 ML: 9 INJECTION, SOLUTION INTRAVENOUS at 14:00

## 2024-02-25 ASSESSMENT — PAIN SCALES - GENERAL: PAINLEVEL_OUTOF10: 0

## 2024-02-25 NOTE — DISCHARGE INSTRUCTIONS
You were seen in the emergency department today for evaluation of generalized fatigue.  Your labs are without evidence of anemia.  You are to have a mild elevation in your TSH level, which could indicate an underactive thyroid.  You have additional thyroid labs that were drawn and are pending.  Your blood sugar was only mildly elevated today.  I did send off a hemoglobin A1c, which would be a better predictor of if you are diabetic or prediabetic.  Please follow-up with [facility] listed on your paperwork as they can assist you with primary care needs and follow-up on these lab results.

## 2024-02-25 NOTE — ED TRIAGE NOTES
Pt is Maldivian speaking # [id number]    Pt c/o dizziness, fatigue, generalized weakness, chills x 8 days.
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
## 2024-03-07 ENCOUNTER — TRANSCRIBE ORDERS (OUTPATIENT)
Facility: HOSPITAL | Age: 44
End: 2024-03-07

## 2024-03-07 DIAGNOSIS — Z12.31 BREAST CANCER SCREENING BY MAMMOGRAM: Primary | ICD-10-CM

## 2024-03-13 ENCOUNTER — TELEPHONE (OUTPATIENT)
Age: 44
End: 2024-03-13

## 2024-03-13 NOTE — TELEPHONE ENCOUNTER
Called patient to remind of upcoming appointment with [facility] on 3/21/2024- no answer. Left message with appt. Details and [facility] hotline in case that she wants to cancel/reschedule.   Daniel Urban

## 2024-07-15 ENCOUNTER — TELEPHONE (OUTPATIENT)
Facility: HOSPITAL | Age: 44
End: 2024-07-15

## 2024-07-15 NOTE — TELEPHONE ENCOUNTER
Patient called office this morning regarding her appointment and needing the address could only speak Georgian, returned patient call with  Cheryl ID # [id number]. No answer and  LVM for patient to return  call to office

## 2024-07-16 ENCOUNTER — CLINICAL DOCUMENTATION (OUTPATIENT)
Facility: HOSPITAL | Age: 44
End: 2024-07-16

## 2024-07-16 PROBLEM — T22.091A: Status: ACTIVE | Noted: 2024-07-16

## 2024-07-16 NOTE — PROGRESS NOTES
Patient arrived for appointment today, spoke with patient with the assistance of  Bernard ID# [id number]. Patient reported to me she had a keloid that was itching and burning, asked patient if it was opened. Patient was escorted to exam room with myself and Hellen Tejeda RN and the patient showed the Keloid scar. Hellen explain to patient that a dermatologist would be best as we only provide care for open wounds and dermatologist will possibly be able to give her a cream to help with burning and no infection appear to be evident. Patient was given contact number for dermatologist. Appointment for wound care cancelled per not needed.

## 2024-10-04 ENCOUNTER — HOSPITAL ENCOUNTER (OUTPATIENT)
Facility: HOSPITAL | Age: 44
Setting detail: SPECIMEN
Discharge: HOME | End: 2024-10-07

## 2024-10-04 PROCEDURE — 85025 COMPLETE CBC W/AUTO DIFF WBC: CPT

## 2024-10-04 PROCEDURE — 82607 VITAMIN B-12: CPT

## 2024-10-04 PROCEDURE — 83540 ASSAY OF IRON: CPT

## 2024-10-04 PROCEDURE — 82746 ASSAY OF FOLIC ACID SERUM: CPT

## 2024-10-04 PROCEDURE — 84443 ASSAY THYROID STIM HORMONE: CPT

## 2024-10-04 PROCEDURE — 84439 ASSAY OF FREE THYROXINE: CPT

## 2024-10-04 PROCEDURE — 82306 VITAMIN D 25 HYDROXY: CPT

## 2024-10-04 PROCEDURE — 83550 IRON BINDING TEST: CPT

## 2024-10-05 LAB
25(OH)D3 SERPL-MCNC: 27.1 NG/ML (ref 30–100)
BASOPHILS # BLD: 0 K/UL (ref 0–0.1)
BASOPHILS NFR BLD: 0 % (ref 0–1)
DIFFERENTIAL METHOD BLD: ABNORMAL
EOSINOPHIL # BLD: 0.1 K/UL (ref 0–0.4)
EOSINOPHIL NFR BLD: 1 % (ref 0–7)
ERYTHROCYTE [DISTWIDTH] IN BLOOD BY AUTOMATED COUNT: 12.8 % (ref 11.5–14.5)
FOLATE SERPL-MCNC: 21 NG/ML (ref 5–21)
HCT VFR BLD AUTO: 44.7 % (ref 35–47)
HGB BLD-MCNC: 14.4 G/DL (ref 11.5–16)
IMM GRANULOCYTES # BLD AUTO: 0.1 K/UL (ref 0–0.04)
IMM GRANULOCYTES NFR BLD AUTO: 1 % (ref 0–0.5)
IRON SATN MFR SERPL: 45 % (ref 20–50)
IRON SERPL-MCNC: 172 UG/DL (ref 35–150)
LYMPHOCYTES # BLD: 2.9 K/UL (ref 0.8–3.5)
LYMPHOCYTES NFR BLD: 26 % (ref 12–49)
MCH RBC QN AUTO: 32.1 PG (ref 26–34)
MCHC RBC AUTO-ENTMCNC: 32.2 G/DL (ref 30–36.5)
MCV RBC AUTO: 99.8 FL (ref 80–99)
MONOCYTES # BLD: 0.7 K/UL (ref 0–1)
MONOCYTES NFR BLD: 7 % (ref 5–13)
NEUTS SEG # BLD: 7.3 K/UL (ref 1.8–8)
NEUTS SEG NFR BLD: 65 % (ref 32–75)
NRBC # BLD: 0 K/UL (ref 0–0.01)
NRBC BLD-RTO: 0 PER 100 WBC
PLATELET # BLD AUTO: 248 K/UL (ref 150–400)
PMV BLD AUTO: 11.8 FL (ref 8.9–12.9)
RBC # BLD AUTO: 4.48 M/UL (ref 3.8–5.2)
T4 FREE SERPL-MCNC: 0.9 NG/DL (ref 0.8–1.5)
TIBC SERPL-MCNC: 383 UG/DL (ref 250–450)
TSH SERPL DL<=0.05 MIU/L-ACNC: 2.62 UIU/ML (ref 0.36–3.74)
VIT B12 SERPL-MCNC: >2000 PG/ML (ref 193–986)
WBC # BLD AUTO: 11.2 K/UL (ref 3.6–11)

## 2024-11-07 ENCOUNTER — HOSPITAL ENCOUNTER (EMERGENCY)
Facility: HOSPITAL | Age: 44
Discharge: HOME | End: 2024-11-07
Attending: STUDENT IN AN ORGANIZED HEALTH CARE EDUCATION/TRAINING PROGRAM

## 2024-11-07 VITALS
DIASTOLIC BLOOD PRESSURE: 92 MMHG | RESPIRATION RATE: 20 BRPM | TEMPERATURE: 98 F | SYSTOLIC BLOOD PRESSURE: 151 MMHG | HEART RATE: 92 BPM | WEIGHT: 164.9 LBS | OXYGEN SATURATION: 100 %

## 2024-11-07 DIAGNOSIS — R68.2 DRY MOUTH: Primary | ICD-10-CM

## 2024-11-07 PROCEDURE — 99282 EMERGENCY DEPT VISIT SF MDM: CPT

## 2024-11-07 ASSESSMENT — PAIN - FUNCTIONAL ASSESSMENT: PAIN_FUNCTIONAL_ASSESSMENT: 0-10

## 2024-11-07 ASSESSMENT — PAIN SCALES - GENERAL: PAINLEVEL_OUTOF10: 0

## 2024-11-08 NOTE — FLOWSHEET NOTE
Session ID: [id number]  Language: Danish   ID: #[id number]   Name: Bebeto    Discharge instruction reviewed by Shahla Sheridan RN and  with the patient.  The patient verbalized understanding. Patient provided with AVS.    Patient is ambulatory and steady gait upon discharge. Patient is AAOX4, breathing even and unlabored, skin warm and dry, skin intact.    Patient mobility status  with no difficulty. Provider aware     Patient left ED via Discharge Method: ambulatory to Home with  self .    Opportunity for questions and clarification provided.     Patient given 0 paper scripts.

## 2024-11-08 NOTE — ED TRIAGE NOTES
ID [id number] used for translation. Patient to triage for concern of thick saliva. Lost .         404.   Patient states she went to see someone who gave her injections to cure her. Patient states her saliva has been thick for the past 6 months.

## 2024-11-08 NOTE — ED PROVIDER NOTES
[facility] EMERGENCY DEPT  EMERGENCY DEPARTMENT ENCOUNTER      Pt Name: Hannah Hammer  MRN: [id number]  Birthdate 1980  Date of evaluation: 11/7/2024  Provider: Sophia Womack DO    CHIEF COMPLAINT       Chief Complaint   Patient presents with    Dehydration     Dry mouth, thick saliva for the past 6 months       PMH   Past Medical History:   Diagnosis Date    Abnormal Pap smear of cervix          MDM:   Vitals:    Vitals:    11/07/24 2009   BP: (!) 151/92   Pulse: 92   Resp: 20   Temp: 98 °F (36.7 °C)   SpO2: 100%           This is a 44 y.o. female with no significant pmhx who presents today for cc of dry mouth.  Patient Bangladeshi-speaking only and  was utilized for the entirety of the interaction.  She states that 6 months ago she started having some dry mouth, it seems to correspond with the time that she was getting some injections.  When asked what injections, by whom and for what patient states that she would rather not tell us and clarified again to make sure that everything that she said was confidential.  After additional reassurance patient refuses to tell us what exactly these injections were for, who was injecting them, or even what was in them.  She states that she just has noticed that since then she has had thick saliva and a dry mouth and she is not sure if they are from the injections.  She denies any other medical complaints, denies any dental pain, headache, difficulty swallowing, drooling, fevers.    On arrival VS stable.   Physical Exam  General: Alert, no acute distress  HEENT: Normocephalic, atraumatic. EOMI, moist oral mucosa, no conjunctival injection.  Posterior oropharynx clear.  Remaining dentition well-appearing and intact.  Neck: ROM normal, supple  Cardio: Heart regular rate and rhythm  Lungs: No respiratory distress  MSK: ROM normal  Skin: Warm, dry, no rash  Neuro: No focal neurodeficits, Aox3    Patient's exam is quite benign, mucosa appears moist and she is

## 2024-12-10 ENCOUNTER — HOSPITAL ENCOUNTER (OUTPATIENT)
Facility: HOSPITAL | Age: 44
Discharge: HOME | End: 2024-12-13
Payer: SUBSIDIZED

## 2024-12-10 VITALS — WEIGHT: 164 LBS | BODY MASS INDEX: 30.96 KG/M2 | HEIGHT: 61 IN

## 2024-12-10 DIAGNOSIS — R92.8 ABNORMAL MAMMOGRAM: Primary | ICD-10-CM

## 2024-12-10 DIAGNOSIS — Z12.31 BREAST CANCER SCREENING BY MAMMOGRAM: ICD-10-CM

## 2024-12-10 PROCEDURE — 77063 BREAST TOMOSYNTHESIS BI: CPT

## 2024-12-13 ENCOUNTER — TELEPHONE (OUTPATIENT)
Age: 44
End: 2024-12-13

## 2024-12-13 NOTE — TELEPHONE ENCOUNTER
The  services used. Called pt but no answer, able to leave a vm. Pt needs additional images after screening mammogram done. Pt did not complete screening mammogram with [facility], need to contact pt to complete screening for eligibility and [facility] paperwork if pt is eligible. Once that is completed with pt contact Maryjo at [facility] so she can schedule pt for her additional images. MARCELINO PETERSON RN

## 2024-12-16 NOTE — TELEPHONE ENCOUNTER
Pt returned call, pt completed [facility] Eligibility paperwork. I was unable to reach Maryjo at [facility] to schedule pt for further imaging. Pt did ask for [facility] number to schedule an checkup with them. Please contact pt to discuss, thank you.

## 2024-12-17 NOTE — TELEPHONE ENCOUNTER
The  services used. Pt did not answer, left vm. Calling pt to complete [facility] paperwork. MARCELINO PETERSON RN

## 2024-12-17 NOTE — TELEPHONE ENCOUNTER
Patient calling the main [facility] office, returning a phone call. Chart reviewed. Discussed need for additional breast imaging and that this office could assist with Dx breast imaging charges but not the screening as she did the test prior to enrolling for program. Pt agrees, pt's eligibility has been completed by the [facility] screening line. I tried calling [facility] for an appt. But no answer, I have sent a message requesting for an appt. Pt will also be meeting with me in person to complete program eligibility and apply for BS FA. Flaca Castellano RN

## 2024-12-19 ENCOUNTER — HOSPITAL ENCOUNTER (OUTPATIENT)
Facility: HOSPITAL | Age: 44
Discharge: HOME | End: 2024-12-22
Payer: SUBSIDIZED

## 2024-12-19 DIAGNOSIS — R92.8 ABNORMAL MAMMOGRAM: ICD-10-CM

## 2024-12-19 PROCEDURE — G0279 TOMOSYNTHESIS, MAMMO: HCPCS

## 2024-12-19 PROCEDURE — 76642 ULTRASOUND BREAST LIMITED: CPT
